# Patient Record
Sex: MALE | Race: WHITE | Employment: OTHER | URBAN - METROPOLITAN AREA
[De-identification: names, ages, dates, MRNs, and addresses within clinical notes are randomized per-mention and may not be internally consistent; named-entity substitution may affect disease eponyms.]

---

## 2022-01-18 ENCOUNTER — HOSPITAL ENCOUNTER (INPATIENT)
Age: 82
LOS: 1 days | Discharge: HOME | DRG: 303 | End: 2022-01-19
Attending: EMERGENCY MEDICINE | Admitting: FAMILY MEDICINE
Payer: MEDICARE

## 2022-01-18 ENCOUNTER — APPOINTMENT (OUTPATIENT)
Dept: GENERAL RADIOLOGY | Age: 82
DRG: 303 | End: 2022-01-18
Payer: MEDICARE

## 2022-01-18 DIAGNOSIS — N18.9 CHRONIC KIDNEY DISEASE, UNSPECIFIED CKD STAGE: ICD-10-CM

## 2022-01-18 DIAGNOSIS — R07.9 CHEST PAIN, UNSPECIFIED TYPE: Primary | ICD-10-CM

## 2022-01-18 LAB
ABSOLUTE EOS #: 0.19 K/UL (ref 0–0.44)
ABSOLUTE IMMATURE GRANULOCYTE: 0.02 K/UL (ref 0–0.3)
ABSOLUTE LYMPH #: 1.18 K/UL (ref 1.1–3.7)
ABSOLUTE MONO #: 0.63 K/UL (ref 0.1–1.2)
ANION GAP SERPL CALCULATED.3IONS-SCNC: 15 MMOL/L (ref 9–17)
BASOPHILS # BLD: 1 % (ref 0–2)
BASOPHILS ABSOLUTE: 0.04 K/UL (ref 0–0.2)
BNP INTERPRETATION: NORMAL
BUN BLDV-MCNC: 27 MG/DL (ref 8–23)
BUN/CREAT BLD: 16 (ref 9–20)
CALCIUM SERPL-MCNC: 9.3 MG/DL (ref 8.6–10.4)
CHLORIDE BLD-SCNC: 101 MMOL/L (ref 98–107)
CO2: 21 MMOL/L (ref 20–31)
CREAT SERPL-MCNC: 1.67 MG/DL (ref 0.7–1.2)
DIFFERENTIAL TYPE: ABNORMAL
EOSINOPHILS RELATIVE PERCENT: 3 % (ref 1–4)
GFR AFRICAN AMERICAN: 48 ML/MIN
GFR NON-AFRICAN AMERICAN: 40 ML/MIN
GFR SERPL CREATININE-BSD FRML MDRD: ABNORMAL ML/MIN/{1.73_M2}
GFR SERPL CREATININE-BSD FRML MDRD: ABNORMAL ML/MIN/{1.73_M2}
GLUCOSE BLD-MCNC: 188 MG/DL (ref 70–99)
HCT VFR BLD CALC: 41.7 % (ref 40.7–50.3)
HEMOGLOBIN: 14.1 G/DL (ref 13–17)
IMMATURE GRANULOCYTES: 0 %
LYMPHOCYTES # BLD: 20 % (ref 24–43)
MCH RBC QN AUTO: 31.8 PG (ref 25.2–33.5)
MCHC RBC AUTO-ENTMCNC: 33.8 G/DL (ref 28.4–34.8)
MCV RBC AUTO: 94.1 FL (ref 82.6–102.9)
MONOCYTES # BLD: 11 % (ref 3–12)
NRBC AUTOMATED: 0 PER 100 WBC
PDW BLD-RTO: 12 % (ref 11.8–14.4)
PLATELET # BLD: 165 K/UL (ref 138–453)
PLATELET ESTIMATE: ABNORMAL
PMV BLD AUTO: 9.7 FL (ref 8.1–13.5)
POTASSIUM SERPL-SCNC: 3.7 MMOL/L (ref 3.7–5.3)
PRO-BNP: 80 PG/ML
RBC # BLD: 4.43 M/UL (ref 4.21–5.77)
RBC # BLD: ABNORMAL 10*6/UL
SEG NEUTROPHILS: 65 % (ref 36–65)
SEGMENTED NEUTROPHILS ABSOLUTE COUNT: 3.93 K/UL (ref 1.5–8.1)
SODIUM BLD-SCNC: 137 MMOL/L (ref 135–144)
TROPONIN INTERP: ABNORMAL
TROPONIN INTERP: ABNORMAL
TROPONIN INTERP: NORMAL
TROPONIN T: ABNORMAL NG/ML
TROPONIN T: ABNORMAL NG/ML
TROPONIN T: NORMAL NG/ML
TROPONIN, HIGH SENSITIVITY: 19 NG/L (ref 0–22)
TROPONIN, HIGH SENSITIVITY: 23 NG/L (ref 0–22)
TROPONIN, HIGH SENSITIVITY: 28 NG/L (ref 0–22)
WBC # BLD: 6 K/UL (ref 3.5–11.3)
WBC # BLD: ABNORMAL 10*3/UL

## 2022-01-18 PROCEDURE — 80048 BASIC METABOLIC PNL TOTAL CA: CPT

## 2022-01-18 PROCEDURE — 96372 THER/PROPH/DIAG INJ SC/IM: CPT

## 2022-01-18 PROCEDURE — 83880 ASSAY OF NATRIURETIC PEPTIDE: CPT

## 2022-01-18 PROCEDURE — 6370000000 HC RX 637 (ALT 250 FOR IP): Performed by: EMERGENCY MEDICINE

## 2022-01-18 PROCEDURE — 6360000002 HC RX W HCPCS: Performed by: EMERGENCY MEDICINE

## 2022-01-18 PROCEDURE — 93005 ELECTROCARDIOGRAM TRACING: CPT | Performed by: EMERGENCY MEDICINE

## 2022-01-18 PROCEDURE — 2000000000 HC ICU R&B

## 2022-01-18 PROCEDURE — 85025 COMPLETE CBC W/AUTO DIFF WBC: CPT

## 2022-01-18 PROCEDURE — 2580000003 HC RX 258: Performed by: EMERGENCY MEDICINE

## 2022-01-18 PROCEDURE — 71045 X-RAY EXAM CHEST 1 VIEW: CPT

## 2022-01-18 PROCEDURE — 36415 COLL VENOUS BLD VENIPUNCTURE: CPT

## 2022-01-18 PROCEDURE — 6370000000 HC RX 637 (ALT 250 FOR IP): Performed by: INTERNAL MEDICINE

## 2022-01-18 PROCEDURE — 99284 EMERGENCY DEPT VISIT MOD MDM: CPT

## 2022-01-18 PROCEDURE — 84484 ASSAY OF TROPONIN QUANT: CPT

## 2022-01-18 PROCEDURE — 6370000000 HC RX 637 (ALT 250 FOR IP): Performed by: FAMILY MEDICINE

## 2022-01-18 RX ORDER — DILTIAZEM HYDROCHLORIDE 180 MG/1
180 CAPSULE, COATED, EXTENDED RELEASE ORAL DAILY
COMMUNITY

## 2022-01-18 RX ORDER — SODIUM CHLORIDE 0.9 % (FLUSH) 0.9 %
5-40 SYRINGE (ML) INJECTION EVERY 12 HOURS SCHEDULED
Status: DISCONTINUED | OUTPATIENT
Start: 2022-01-18 | End: 2022-01-19 | Stop reason: HOSPADM

## 2022-01-18 RX ORDER — EZETIMIBE 10 MG/1
10 TABLET ORAL NIGHTLY
COMMUNITY

## 2022-01-18 RX ORDER — SODIUM CHLORIDE 9 MG/ML
25 INJECTION, SOLUTION INTRAVENOUS PRN
Status: DISCONTINUED | OUTPATIENT
Start: 2022-01-18 | End: 2022-01-19 | Stop reason: HOSPADM

## 2022-01-18 RX ORDER — METOPROLOL SUCCINATE 25 MG/1
25 TABLET, EXTENDED RELEASE ORAL DAILY
Status: DISCONTINUED | OUTPATIENT
Start: 2022-01-18 | End: 2022-01-18

## 2022-01-18 RX ORDER — METOPROLOL SUCCINATE 25 MG/1
25 TABLET, EXTENDED RELEASE ORAL NIGHTLY
Status: DISCONTINUED | OUTPATIENT
Start: 2022-01-18 | End: 2022-01-19 | Stop reason: HOSPADM

## 2022-01-18 RX ORDER — VITAMIN B COMPLEX
1000 TABLET ORAL DAILY
Status: DISCONTINUED | OUTPATIENT
Start: 2022-01-18 | End: 2022-01-19 | Stop reason: HOSPADM

## 2022-01-18 RX ORDER — ASPIRIN 325 MG
325 TABLET ORAL DAILY
COMMUNITY

## 2022-01-18 RX ORDER — MORPHINE SULFATE 4 MG/ML
4 INJECTION, SOLUTION INTRAMUSCULAR; INTRAVENOUS
Status: DISCONTINUED | OUTPATIENT
Start: 2022-01-18 | End: 2022-01-19 | Stop reason: HOSPADM

## 2022-01-18 RX ORDER — ONDANSETRON 2 MG/ML
4 INJECTION INTRAMUSCULAR; INTRAVENOUS EVERY 6 HOURS PRN
Status: DISCONTINUED | OUTPATIENT
Start: 2022-01-18 | End: 2022-01-19 | Stop reason: HOSPADM

## 2022-01-18 RX ORDER — METOPROLOL SUCCINATE 25 MG/1
25 TABLET, EXTENDED RELEASE ORAL NIGHTLY
Status: DISCONTINUED | OUTPATIENT
Start: 2022-01-19 | End: 2022-01-18

## 2022-01-18 RX ORDER — EZETIMIBE 10 MG/1
10 TABLET ORAL NIGHTLY
Status: DISCONTINUED | OUTPATIENT
Start: 2022-01-18 | End: 2022-01-19 | Stop reason: HOSPADM

## 2022-01-18 RX ORDER — MORPHINE SULFATE 4 MG/ML
4 INJECTION, SOLUTION INTRAMUSCULAR; INTRAVENOUS ONCE
Status: DISCONTINUED | OUTPATIENT
Start: 2022-01-18 | End: 2022-01-19 | Stop reason: HOSPADM

## 2022-01-18 RX ORDER — KRILL/OM-3/DHA/EPA/PHOSPHO/AST 500MG-86MG
1 CAPSULE ORAL DAILY
Status: DISCONTINUED | OUTPATIENT
Start: 2022-01-18 | End: 2022-01-18 | Stop reason: RX

## 2022-01-18 RX ORDER — MULTIVITAMIN WITH IRON
1 TABLET ORAL DAILY
Status: DISCONTINUED | OUTPATIENT
Start: 2022-01-18 | End: 2022-01-19 | Stop reason: HOSPADM

## 2022-01-18 RX ORDER — CLOPIDOGREL BISULFATE 75 MG/1
75 TABLET ORAL DAILY
COMMUNITY

## 2022-01-18 RX ORDER — MORPHINE SULFATE 2 MG/ML
2 INJECTION, SOLUTION INTRAMUSCULAR; INTRAVENOUS
Status: DISCONTINUED | OUTPATIENT
Start: 2022-01-18 | End: 2022-01-19 | Stop reason: HOSPADM

## 2022-01-18 RX ORDER — TAMSULOSIN HYDROCHLORIDE 0.4 MG/1
0.4 CAPSULE ORAL DAILY
Status: DISCONTINUED | OUTPATIENT
Start: 2022-01-18 | End: 2022-01-19 | Stop reason: HOSPADM

## 2022-01-18 RX ORDER — VITAMIN B COMPLEX
1000 TABLET ORAL DAILY
COMMUNITY

## 2022-01-18 RX ORDER — HYDROCHLOROTHIAZIDE 12.5 MG/1
12.5 CAPSULE, GELATIN COATED ORAL DAILY
COMMUNITY

## 2022-01-18 RX ORDER — DILTIAZEM HYDROCHLORIDE 180 MG/1
180 CAPSULE, COATED, EXTENDED RELEASE ORAL DAILY
Status: DISCONTINUED | OUTPATIENT
Start: 2022-01-18 | End: 2022-01-19 | Stop reason: HOSPADM

## 2022-01-18 RX ORDER — ACETAMINOPHEN 500 MG
1000 TABLET ORAL EVERY 8 HOURS PRN
Status: DISCONTINUED | OUTPATIENT
Start: 2022-01-18 | End: 2022-01-19 | Stop reason: HOSPADM

## 2022-01-18 RX ORDER — HYDROCHLOROTHIAZIDE 12.5 MG/1
12.5 CAPSULE, GELATIN COATED ORAL EVERY OTHER DAY
Status: DISCONTINUED | OUTPATIENT
Start: 2022-01-19 | End: 2022-01-19 | Stop reason: HOSPADM

## 2022-01-18 RX ORDER — METOPROLOL SUCCINATE 25 MG/1
25 TABLET, EXTENDED RELEASE ORAL DAILY
COMMUNITY

## 2022-01-18 RX ORDER — SODIUM CHLORIDE 0.9 % (FLUSH) 0.9 %
5-40 SYRINGE (ML) INJECTION PRN
Status: DISCONTINUED | OUTPATIENT
Start: 2022-01-18 | End: 2022-01-19 | Stop reason: HOSPADM

## 2022-01-18 RX ORDER — ATORVASTATIN CALCIUM 20 MG/1
20 TABLET, FILM COATED ORAL DAILY
Status: DISCONTINUED | OUTPATIENT
Start: 2022-01-18 | End: 2022-01-19 | Stop reason: HOSPADM

## 2022-01-18 RX ORDER — TAMSULOSIN HYDROCHLORIDE 0.4 MG/1
0.4 CAPSULE ORAL DAILY
Status: DISCONTINUED | OUTPATIENT
Start: 2022-01-18 | End: 2022-01-18

## 2022-01-18 RX ORDER — SIMVASTATIN 40 MG
40 TABLET ORAL EVERY MORNING
COMMUNITY

## 2022-01-18 RX ORDER — ONDANSETRON 4 MG/1
4 TABLET, ORALLY DISINTEGRATING ORAL EVERY 8 HOURS PRN
Status: DISCONTINUED | OUTPATIENT
Start: 2022-01-18 | End: 2022-01-19 | Stop reason: HOSPADM

## 2022-01-18 RX ORDER — CLOPIDOGREL BISULFATE 75 MG/1
75 TABLET ORAL DAILY
Status: DISCONTINUED | OUTPATIENT
Start: 2022-01-18 | End: 2022-01-19 | Stop reason: HOSPADM

## 2022-01-18 RX ORDER — NITROGLYCERIN 40 MG/1
1 PATCH TRANSDERMAL DAILY
Status: DISCONTINUED | OUTPATIENT
Start: 2022-01-18 | End: 2022-01-19 | Stop reason: HOSPADM

## 2022-01-18 RX ORDER — HYDROCHLOROTHIAZIDE 12.5 MG/1
12.5 CAPSULE, GELATIN COATED ORAL DAILY
Status: DISCONTINUED | OUTPATIENT
Start: 2022-01-18 | End: 2022-01-18

## 2022-01-18 RX ORDER — NITROGLYCERIN 0.4 MG/1
0.4 TABLET SUBLINGUAL EVERY 5 MIN PRN
Status: DISCONTINUED | OUTPATIENT
Start: 2022-01-18 | End: 2022-01-19 | Stop reason: HOSPADM

## 2022-01-18 RX ORDER — NITROGLYCERIN 0.4 MG/1
0.4 TABLET SUBLINGUAL EVERY 5 MIN PRN
COMMUNITY

## 2022-01-18 RX ORDER — ALFUZOSIN HYDROCHLORIDE 10 MG/1
10 TABLET, EXTENDED RELEASE ORAL DAILY
COMMUNITY

## 2022-01-18 RX ORDER — ASPIRIN 325 MG
325 TABLET ORAL DAILY
Status: DISCONTINUED | OUTPATIENT
Start: 2022-01-18 | End: 2022-01-19 | Stop reason: HOSPADM

## 2022-01-18 RX ADMIN — MULTIVITAMIN TABLET 1 TABLET: TABLET at 09:46

## 2022-01-18 RX ADMIN — ATORVASTATIN CALCIUM 20 MG: 20 TABLET, FILM COATED ORAL at 09:46

## 2022-01-18 RX ADMIN — ENOXAPARIN SODIUM 40 MG: 100 INJECTION SUBCUTANEOUS at 09:46

## 2022-01-18 RX ADMIN — TAMSULOSIN HYDROCHLORIDE 0.4 MG: 0.4 CAPSULE ORAL at 17:58

## 2022-01-18 RX ADMIN — CLOPIDOGREL BISULFATE 75 MG: 75 TABLET ORAL at 09:46

## 2022-01-18 RX ADMIN — METOPROLOL SUCCINATE 25 MG: 25 TABLET, EXTENDED RELEASE ORAL at 21:53

## 2022-01-18 RX ADMIN — EZETIMIBE 10 MG: 10 TABLET ORAL at 21:15

## 2022-01-18 RX ADMIN — ASPIRIN 325 MG: 325 TABLET ORAL at 09:46

## 2022-01-18 RX ADMIN — SODIUM CHLORIDE, PRESERVATIVE FREE 10 ML: 5 INJECTION INTRAVENOUS at 21:14

## 2022-01-18 RX ADMIN — SODIUM CHLORIDE, PRESERVATIVE FREE 10 ML: 5 INJECTION INTRAVENOUS at 09:53

## 2022-01-18 RX ADMIN — Medication 1000 UNITS: at 09:46

## 2022-01-18 RX ADMIN — DILTIAZEM HYDROCHLORIDE 180 MG: 180 CAPSULE, COATED, EXTENDED RELEASE ORAL at 09:46

## 2022-01-18 ASSESSMENT — ENCOUNTER SYMPTOMS
SHORTNESS OF BREATH: 0
BACK PAIN: 0
NAUSEA: 0
VOMITING: 0

## 2022-01-18 ASSESSMENT — HEART SCORE: ECG: 1

## 2022-01-18 ASSESSMENT — PAIN SCALES - GENERAL: PAINLEVEL_OUTOF10: 0

## 2022-01-18 NOTE — PROGRESS NOTES
1230 - pt walked hallways per Dr. Efrain Rosas request, RN assessed for CP during and after walking. No complaints of chest pain    1330 - pt walked hallways, no chest pain    RN paged Dr. Juan Pablo Kumar to let him know that cardiology is okay with discharge. Awaiting response at this time.

## 2022-01-18 NOTE — CARE COORDINATION
Case Management Initial Discharge Plan  Saumya Macias,             Met with:patient to discuss discharge plans. Information verified: address, contacts, phone number, , insurance Yes  Insurance Provider: medicare    Emergency Contact/Next of Kin name & number: spouse/Roxanne   [phone]  Who are involved in patient's support system? spouse    PCP: Monae Lawrence MD  Date of last visit: 2021      Discharge Planning    Living Arrangements:        Home has 1 stories  2 stairs to climb to get into front door, 0stairs to climb to reach second floor  Location of bedroom/bathroom in home main    Patient able to perform ADL's:Independent    Current Services (outpatient & in home) none  DME equipment: CPAP  DME provider: unknown    Is patient receiving oral anticoagulation therapy? No    If indicated:   Physician managing anticoagulation treatment:   Where does patient obtain lab work for ATC treatment? Potential Assistance Needed:       Patient agreeable to home care: No  [place] of choice provided:  n/a    Prior SNF/Rehab Placement and Facility: n/a  Agreeable to SNF/Rehab: No  [place] of choice provided: n/a     Evaluation: no    Expected Discharge date:       Patient expects to be discharged to: If home: is the family and/or caregiver wiling & able to provide support at home? yes  Who will be providing this support? Self and spouse    Follow Up Appointment: Best Day/ Time:      Transportation provider: spouse  Transportation arrangements needed for discharge: No    Readmission Risk              Risk of Unplanned Readmission:  14             Does patient have a readmission risk score greater than 14?: No  If yes, follow-up appointment must be made within 7 days of discharge.      Goals of Care:       Educated patient on transitional options, provided freedom of choice and are agreeable with plan      Discharge Plan: Chest pain  Patient lives with spouse in a 1 story home with 2 steps to enter. Declines any skilled needs. Independent  Uses a CPAP. Pharmacy is [facility] on [address]. Plan is to treat with med management. Patient has a history of stents and CAD. Continue to follow.              Electronically signed by Nitin Grey RN on 1/18/22 at 12:40 PM EST

## 2022-01-18 NOTE — ED NOTES
Pt presents to the ER for chest pressure. Pt states the chest pressure was a centralized pressure that woke him up around 0030. Pt states he took his aspirin and the squad gave him a nitro spray. Pt states at this point his chest pressure is totally gone and he does not have any complaints. Pt denies any pain or SOB laying on the cot. Pt has cardiac history including hypertension and cardiac caths with stent placement. Pt cardiologist is [facility]. He states he last saw him in June.       John Barnes RN  01/18/22 [id number]

## 2022-01-18 NOTE — PLAN OF CARE
Problem: Pain:  Goal: Pain level will decrease  Description: Pain level will decrease  Outcome: Ongoing     Problem: Cardiac:  Goal: Ability to maintain an adequate cardiac output will improve  Description: Ability to maintain an adequate cardiac output will improve  Outcome: Ongoing  Goal: Hemodynamic stability will improve  Description: Hemodynamic stability will improve  Outcome: Ongoing     Problem: Fluid Volume:  Goal: Ability to achieve and maintain adequate urine output will improve  Description: Ability to achieve and maintain adequate urine output will improve  Outcome: Ongoing     Problem: Respiratory:  Goal: Respiratory status will improve  Description: Respiratory status will improve  Outcome: Ongoing     Problem: Falls - Risk of:  Goal: Will remain free from falls  Description: Will remain free from falls  Outcome: Ongoing  Goal: Absence of physical injury  Description: Absence of physical injury  Outcome: Ongoing

## 2022-01-18 NOTE — DISCHARGE INSTR - DIET
added salts and sugars. The more colors of the rainbow you consume, the greater variety of nutrients youre getting. Vahidguille Erick your vegetables each day and try to pick more of the non-starchy options [like potatoes and sweet potatoes], says Sirena Chaudhary. I find that often the white or beige vegetables are forgotten about and viewed as not as nutritious, but these foods, such as onion, cauliflower, and mushrooms, are incredibly healthy.  She also recommends:    Spinach  Broccoli  Cauliflower  Bok edmar  Tomato  Arugula  Leblanc peppers  Carrots  Asparagus  Soluble Fiber  You probably think of fiber as good for digestion, but its also an important component of a heart healthy diet. One of the most important nutrients for heart health is soluble fiber, explains Sirena Chaudhary. Eating soluble fiber can help lower your cholesterol level and better manage blood sugar levels.  Aim for about 10 to 25 grams of soluble fiber per day; you can find it in:    Fiber:  Oats  Beans  Berries  Ground flaxseed    Omega-3 Fatty Acids  Omega-3 fatty acids are found in fatty fish and in some nuts and seeds. These good fats can reduce blood pressure, decrease triglyceride levels, slow the growth of plaque in the arteries and reduce the risk of arrhythmias. Your doctor may prescribe an Omega-3 supplement if youre on a heart patient diet but you should also be eating Omega-3-rich foods such as:    [place]  Ground flaxseed  Hemp Seeds  Joe seeds      Have High Cholesterol? Foods to Avoid:  If you have high blood cholesterol or another cardiovascular health concern, there are certain foods youll want to avoid to keep your heart healthy. One common misconception is that all high cholesterol foods should be avoided completely.  Cholesterol from your diet actually doesnt affect your blood cholesterol levels like it was once thought, says Deysi Garza MS, registered dietitian at [facility]. [facility], you do have to be careful because oftentimes foods high in cholesterol are also high in saturated fat, which needs to be limited on a heart healthy diet.  In other words, dont indulge in galan and whole milk. But go ahead and eat eggs, salmon and shrimp even though they have cholesterol, since theyre not high in saturated fat. Instead of focusing on high cholesterol foods while on a cardiac diet, avoid trans fats and saturated fats and foods high in salt and sugar. Trans Fats and Saturated Fats  Overall, we are more concerned about trans fats raising our blood cholesterol [than we are concerned about high cholesterol foods], explains Raina Griffith. Its recommended you consume zero of this type of fat because it has been so strongly linked with heart disease.       She explains that while trans fats have been banned from processed foods, theyre still present in some foods in small quantities. For example, a jar of peanut butter could say it has 0 grams of trans fat but really contain about 0.4 grams per serving. Several foods with just a little trans fat can add up to too much trans fat. So check the label and make sure the foods youre eating dont contain partially hydrogenated oils.      This can include:    Peanut butter  Packaged cookies  Packaged cakes  Donuts and muffins  For a hearty healthy diet, avoid trans fat. This means choosing baked or roasted foods over fried ones. Also eat red meat about once or twice a week (or less), and select lean cuts, such as sirloin or filet mignon. Berna Wells clear of: Fatty cuts of read meat (porterhouse, rib eye, prime rib)  Any fried food    Saturated fats mostly come from meat and dairy products. Avoiding foods high in saturated fat--and choosing healthier options--can lower your cholesterol level and boost your lipid profile. Fatty beef is an example of a food with saturated fat.  Also on the list is:    [place] [address] with skin  Butter  Cheese and other whole or reduced-fat dairy products  Whole fat dairy     SALT:   Too much salt in your diet is bad for your cardiovascular health. Thats because extra sodium increases blood volume in your blood vessels, raising blood pressure and making your heart work harder to pump it. Eat 1,500 milligrams or less of sodium per day to keep blood pressure low. Your first step is keeping the saltshaker off the table. Instead, use herbs and spices or a salt-substitute such as Mrs. Georges, suggests Herlinda Cortes. Read the label on any pre-made spice mixtures, since often the first ingredient is salt, and you want to stay away from that.  Also be careful of hidden salt in the foods youre eating. Anything over 140 mg of sodium per serving is a no-no. And surprisingly, these foods may be high in sodium:    Cereal  Condiments  Sauces  Sweets (like cookies and cakes)  Sugar  Sorry if youve got a sweet tooth--researchers say eating too much sugar is connected to a higher risk of dying from heart disease. Sadly, most of us eat too much. The average American eats about 22 teaspoons of sugar per day. However, the American Heart Association recommends women eat no more than 6 teaspoons of sugar a day (a.k.a. 24 grams or 100 calories) and men eat no more than 9 teaspoons a day (a.k.a. 36 grams or 150 calories). To significantly reduce your sugar intake, avoid foods with added sugar, such as: Soft drinks  Fruit drinks  Candy  Cakes, cookies and pies  Ice cream  Sweetened yogurt and milk  Sweet breads and waffles  Look out for secret sources of sugar like breads, cereals, yogurts, condiments and sauces, says Herlinda Cortes. Choose foods with less than 9 grams of sugar per serving.       Creating a Heart Healthy Diet Plan   As you work with your doctor and/or nutritionist to create a heart healthy diet plan, youll learn ways to stick to the plan and create delicious meals you and your family can enjoy.       Dr. Rosa Jerez recommends not only stocking your fridge and pantry with healthy foods but your freezer too. Thats because many fruits and vegetables spoil quickly. Raw lean meat may only be usable for a few days in the fridge. But frozen items can last for month. If you always have some foods that fit your cardiac diet in the freezer, youll be able to easily whip something up, even when youre in a rush. Breakfast Ideas: At breakfast, beware of the hidden sugars in many cereals and juices, and look for ways to incorporate lean protein, fiber and Omega-3s into your morning meal. Carter De Luna suggests:    Healthy Omelet: 1 egg + 2 egg whites with ¼ to ½ an avocado and veggies with a few tbsp. hummus or ½ cup baked sweet potato      Tofu Scramble: Tofu (or egg) scramble with tomato, spinach, black beans, garlic a few slices of avocado with 1 slice of 467% whole wheat bread    Loaded Oatmeal: 1 cup cooked rolled gluten-free oats with cinnamon; mix in 1 tbsp almond butter and top with few chopped walnuts, ½ sliced small banana    Protein-Packed Rice Cake: Brown rice cake with 1-2 tbsp low sodium peanut or almond butter (with no partially hydrogenated oils) with 1 small sliced banana      Many typical lunch foods--cold cuts, cured meats, pizza and soup--are high in sodium, so keep that in mind. You probably want to pack your own.  These are a few delicious lunch ideas youll want to whip up:      Chicken Avocado [place]: 100% whole wheat bread with baked chicken, few slice of avocado, lettuce, tomato with side salad of veggies (i.e. beets, onion, carrots) and chickpeas or black beans with olive oil and vinegar    Homemade Rice or Quinoa Bowl: 1/2 to 2/3 cup brown rice or quinoa, ½ cup black beans or kingsley beans, 1-2 cup of veggies (i.e. spinach, broccoli, cauliflower, string beans), topped with baked chicken, fish or tofu    [place] Burger: Make a burger from ground lean turkey with scallion and red pepper and top with few slices of avocado (or 1 slice Swiss cheese), served in low sodium brown rice tortilla or steamed juan david greens    Avocado Tuna Salad: Tuna salad made with ½ mashed avocado with sliced grapes and few chopped walnuts, lettuce and slice of tomato on 1 slice of whole grain bread or on bed of greens      Low Sodium Bean Soup or Chili: Low sodium chili or bean based soup, topped with few slices of avocado. If this is your entire meal, can aim for less than or equal to 500 to 550 milligrams sodium for the soup. Dinner Ideas  The way you prepare your dinner will help you stick to your heart-healthy diet. Select lean cuts of meat and trim fat (and remove poultry skin) before cooking. Broil meat instead of pan-frying it, and drain fat from foods before eating them. You can also make some smart substitutions, such as using low-fat or fat-free cheese and milk, and cooking with liquid vegetable oil (olive, sunflower, canola) instead of solid fats, such as butter, lard and shortening. Here are a few dinner ideas that are both tasty and cardiac-diet friendly:      Baked Chicken or Fish: Bake it with 1 tbsp extra virgin olive oil (or try avocado oil) and a few tablespoons of salsa; serve it with cooked vegetables (i.e. broccoli, asparagus, spinach) and whole grain starch (i.e. ½  cup cooked brown rice, whole wheat pasta, or bean-based pasta)    Breaded Baked [place]: Coat salmon with olive oil, whole wheat bread crumbs, mustard and lemon; serve it with side of vegetable (i.e. broccoli, sautéed spinach with garlic) and a whole grain starch (i.e. sweet potato, quinoa)    [place] ([facility]) Meatballs: Make your meatballs with one pound lean ground turkey, ½  cup quick oats, 1 egg, ½  tsp dried oregano and little pepper. When theyre done cooking, drizzle them with olive oil    Feta Chicken: Bake chicken, and serve it with a side of ½ cup baked butternut squash, ½ cup sautéed broccoli and ½ cup quinoa mixed together.  Top with sprinkle of feta cheese      Weve got even more recipes for heart healthy entrées, heart-healthy seafood recipes and heart-healthy vegetarian recipes. These are a few ideas William Mustafa loves:    1 hard boiled egg with a piece of fruit  Hummus with cut up fresh (or roasted) vegetables (i.e. carrots, peppers, broccoli)  Slice of 551% whole wheat bread with almond or peanut butter and sliced banana  Slice of 509% whole grain bread with ½ mashed avocado, topped with 1-2 tbsp ground flaxseed  [place] yogurt with 1 tbsp peanut/almond butter mixed in or topped with 10-15 nuts, ½ cup berries; can also add in 1 tbsp ground flaxseed, hemp seed or kirstin seed  And yes, you can occasionally indulge in dessert. Here are some heart-healthy dessert recipes we highly recommend. Remember: a change in your diet might tough at first but it truly can change your health--and your life--for the better. And with a little practice, youll get the hang of sticking to your cardiac diet and enjoying your food. Theres so much flexibility with a heart healthy diet, so it can be customized to work for different people, says [employer]. After you recipes you enjoy and making them part of your meal plans, it shouldnt feel like a diet, it should just become your routine. 

## 2022-01-18 NOTE — PROGRESS NOTES
RN paged Dr. Nancy Malin regarding new cardiology consult per ER physician. Cardiology group will follow. Pt sees Dr. Frank Bolden outpatient, last seen in June of this year.

## 2022-01-18 NOTE — ED PROVIDER NOTES
[facility]  Emergency Department Encounter     Pt Name: Juan Cotto  MRN: [id number]  [place] 1940  Date of evaluation: 1/18/22  PCP:  Eliz Garcia MD    [address] [place] [address]       Chief Complaint   Patient presents with    Chest Pain       HISTORY OF PRESENT ILLNESS  (Location/Symptom, Timing/Onset, Context/Setting, Quality, Duration, Modifying Factors, Severity.)    Juan Cotto is a 79 y.o. male who presents with sudden onset of midsternal chest pain that woke him up from his sleep. Describes it as a pressuring sensation. No shortness of breath, nausea or vomiting, diaphoresis, back pain, lightheadedness or dizziness. He does have a known history of coronary artery disease and has multiple stents in his heart. He did take full dose baby aspirin while waiting for EMS to arrive. He also received 1 dose of nitro spray by EMS and states that his pain is now improved. States that he had a stress test done a couple months ago by his cardiologist which was negative. Cannot remember when his last echocardiogram was. PAST MEDICAL / SURGICAL / SOCIAL / FAMILY HISTORY    has a past medical history of CAD (coronary artery disease), Chronic kidney disease, Hypertension, Sinus infection, and Sleep apnea. CAD, HTN     has a past surgical history that includes Tonsillectomy; hernia repair; Appendectomy; angioplasty (04/12/2001); Coronary angioplasty (12/1995); Coronary angioplasty (04/12/2001); Cardiac catheterization (11/18/2005); Cataract removal (Left, 12/15/2011); Cataract removal (Right, 01/05/2012); TURP (02/02/2015); and TURP (01/30/2020).     Social History     Socioeconomic History    Marital status:      Spouse name: Not on file    Number of children: Not on file    Years of education: Not on file    Highest education level: Not on file   Occupational History    Not on file   Tobacco Use    Smoking status: Not on file    Smokeless tobacco: Not on file   Substance and Sexual Activity    Alcohol use: Not on file    Drug use: Not on file    Sexual activity: Not on file   Other Topics Concern    Not on file   Social History Narrative    Not on file     Social Determinants of Health     Financial Resource Strain:     Difficulty of Paying Living Expenses: Not on file   Food Insecurity:     Worried About Running Out of Food in the Last Year: Not on file    [place] of Food in the Last Year: Not on file   Transportation Needs:     Lack of Transportation (Medical): Not on file    Lack of Transportation (Non-Medical): Not on file   Physical Activity:     Days of Exercise per Week: Not on file    Minutes of Exercise per Session: Not on file   Stress:     Feeling of Stress : Not on file   Social Connections:     Frequency of Communication with Friends and Family: Not on file    Frequency of Social Gatherings with Friends and Family: Not on file    Attends Congregation Services: Not on file    Active Member of [address] [place] [address] or Organizations: Not on file    Attends Club or Organization Meetings: Not on file    Marital Status: Not on file   Intimate Partner Violence:     Fear of Current or Ex-Partner: Not on file    Emotionally Abused: Not on file    Physically Abused: Not on file    Sexually Abused: Not on file   Housing Stability:     Unable to Pay for Housing in the Last Year: Not on file    Number of [place] in the Last Year: Not on file    Unstable Housing in the Last Year: Not on file       No family history on file. Allergies:    Patient has no known allergies. Home Medications:  Prior to Admission medications    Medication Sig Start Date End Date Taking?  Authorizing Provider   clopidogrel (PLAVIX) 75 MG tablet Take 75 mg by mouth daily   Yes Historical Provider, MD   dilTIAZem (CARDIZEM CD) 180 MG extended release capsule Take 180 mg by mouth daily   Yes Historical Provider, MD   hydroCHLOROthiazide (MICROZIDE) 12.5 MG capsule Take 12.5 mg by mouth daily   Yes Historical Provider, MD   metoprolol succinate (TOPROL XL) 25 MG extended release tablet Take 25 mg by mouth daily   Yes Historical Provider, MD   nitroGLYCERIN (NITROSTAT) 0.4 MG SL tablet Place 0.4 mg under the tongue every 5 minutes as needed for Chest pain up to max of 3 total doses. If no relief after 1 dose, call 911. Yes Historical Provider, MD   simvastatin (ZOCOR) 40 MG tablet Take 40 mg by mouth every morning   Yes Historical Provider, MD   ezetimibe (ZETIA) 10 MG tablet Take 10 mg by mouth nightly   Yes Historical Provider, MD   halobetasol (ULTRAVATE) 0.05 % cream Apply topically 2 times daily as needed Apply topically 2 times daily. Yes Historical Provider, MD   alfuzosin (UROXATRAL) 10 MG extended release tablet Take 10 mg by mouth daily   Yes Historical Provider, MD   aspirin 325 MG tablet Take 325 mg by mouth daily   Yes Historical Provider, MD   Nizatidine (AXID PO) Take 1 tablet by mouth as needed   Yes Historical Provider, MD   KRILL OIL PO Take 1 capsule by mouth daily   Yes Historical Provider, MD   Multiple Vitamin (MULTIVITAMIN ADULT PO) Take 1 capsule by mouth daily   Yes Historical Provider, MD   Multiple Vitamins-Minerals (OCUVITE ADULT 50+ PO) Take 1 capsule by mouth daily   Yes Historical Provider, MD   Vitamin D (CHOLECALCIFEROL) 25 MCG (1000 UT) TABS tablet Take 1,000 Units by mouth daily   Yes Historical Provider, MD   Soap & Cleansers (CETAPHIL EX) Apply topically   Yes Historical Provider, MD       REVIEW OF SYSTEMS    (2-9 systems for level 4, 10 or more for level 5)    Review of Systems   Constitutional: Negative for diaphoresis. Respiratory: Negative for shortness of breath. Cardiovascular: Positive for chest pain. Gastrointestinal: Negative for nausea and vomiting. Genitourinary: Negative for flank pain. Musculoskeletal: Negative for back pain. Skin: Negative for pallor. Neurological: Negative for dizziness and light-headedness. PHYSICAL EXAM   (up to 7 for level 4, 8 or more for level 5)    VITALS:   Vitals:    22 0221 22 0230 22 0415 22 0500   BP: 134/82 115/73 100/72 108/76   Pulse: 93 94 81 77   Resp:    Temp: 98.4 °F (36.9 °C)      TempSrc: Oral      SpO2: 95% 95% 95% 96%   Weight: 215 lb (97.5 kg)      Height: 5' 6\" (1.676 m)          Physical Exam  Vitals and nursing note reviewed. Constitutional:       General: He is not in acute distress. Appearance: He is well-developed. He is obese. He is not diaphoretic. HENT:      Head: Normocephalic and atraumatic. Eyes:      Conjunctiva/sclera: Conjunctivae normal.   Cardiovascular:      Rate and Rhythm: Regular rhythm. Tachycardia present. Heart sounds: Normal heart sounds. Pulmonary:      Effort: Pulmonary effort is normal. No respiratory distress. Breath sounds: Normal breath sounds. No wheezing, rhonchi or rales. Abdominal:      General: There is no distension. Palpations: Abdomen is soft. Tenderness: There is no abdominal tenderness. Musculoskeletal:         General: Normal range of motion. Cervical back: Normal range of motion. Right lower le+ Pitting Edema present. Left lower le+ Pitting Edema present. Skin:     General: Skin is warm and dry. Coloration: Skin is not pale. Neurological:      General: No focal deficit present. Mental Status: He is alert.    Psychiatric:         Behavior: Behavior normal.         DIFFERENTIAL  DIAGNOSIS   PLAN (LABS / IMAGING / EKG):  Orders Placed This Encounter   Procedures    XR CHEST 1 VIEW    CBC with DIFF    Basic Metabolic Prof    Troponin    Brain Natriuretic Peptide    Telemetry monitoring - continuous duration    Inpatient consult to Cardiology    Inpatient consult to Hospitalist    EKG 12 Lead    Insert peripheral IV    PATIENT STATUS (FROM ED OR OR/PROCEDURAL) Inpatient       MEDICATIONS ORDERED:  Orders Placed This Encounter   Medications    morphine sulfate (PF) injection 4 mg     DIAGNOSTIC RESULTS / EMERGENCYDEPARTMENT COURSE / MDM   LABS:  Labs Reviewed   CBC WITH AUTO DIFFERENTIAL - Abnormal; Notable for the following components:       Result Value    Lymphocytes 20 (*)     All other components within normal limits   BASIC METABOLIC PANEL - Abnormal; Notable for the following components:    Glucose 188 (*)     BUN 27 (*)     CREATININE 1.67 (*)     GFR Non- 40 (*)     GFR  48 (*)     All other components within normal limits   TROPONIN - Abnormal; Notable for the following components:    Troponin, High Sensitivity 28 (*)     All other components within normal limits   TROPONIN   BRAIN NATRIURETIC PEPTIDE       RADIOLOGY:  XR CHEST 1 VIEW    Result Date: 1/18/2022  EXAMINATION: ONE XRAY VIEW OF THE CHEST 1/18/2022 2:26 am COMPARISON: None. HISTORY: ORDERING SYSTEM PROVIDED HISTORY: CP TECHNOLOGIST PROVIDED HISTORY: CP Reason for Exam: chest pain FINDINGS: The cardiomediastinal silhouette within normal limits for portable technique. There is elevation of right hemidiaphragm. No focal airspace disease. No pleural effusion or pneumothorax. No evidence of acute osseous abnormality. No evidence of acute cardiopulmonary disease.        EKG    EKG Interpretation    Interpreted by emergency department physician    Rhythm: sinus tachycardia  Rate: normal  Axis: left  Ectopy: none  Conduction: right bundle branch block (complete) and 1st degree AV block  ST Segments: no acute change  T Waves: no acute change  Q Waves: none    Clinical Impression: non-specific EKG    All EKG's are interpreted by the Emergency Department Physician whoeither signs or Co-signs this chart in the absence of a cardiologist.    EMERGENCY DEPARTMENT COURSE:  ED Course as of 01/18/22 0557   Tue Jan 18, 2022   0226 Sees Dr. Sachin Robles  [AO]   0230 CBC with DIFF(!):    WBC 6.0   RBC 4.43   Hemoglobin Quant 14.1   Hematocrit 41.7   MCV 94.1   MCH 31.8   MCHC 33.8   RDW 12.0   Platelet Count 888   MPV 9.7   NRBC Automated 0.0   Differential Type NOT REPORTED   Seg Neutrophils 65   Lymphocytes 20(!)   Monocytes 11   Eosinophils % 3   Basophils 1   Immature Granulocytes 0   Segs Absolute 3.93   Absolute Lymph # 1.18   Absolute Mono # 0.63   Absolute Eos # 0.19   Basophils Absolute 0.04   Absolute Immature Granulocyte 0.02   WBC Morphology NOT REPORTED   RBC Morphology NOT REPORTED   Platelet Estimate NOT REPORTED [AO]   9564 Basic Metabolic Prof(!):    Glucose 188(!)   BUN 27(!)   Creatinine 1.67(!)   Bun/Cre Ratio 16   CALCIUM, SERUM, 104184 9.3   Sodium 137   Potassium 3.7   Chloride 101   CO2 21   Anion Gap 15   GFR Non- 40(!)   GFR  48(!)   GFR Comment        GFR Staging NOT REPORTED [AO]   0258 Troponin, High Sensitivity: 19 [AO]   0258 Pro-BNP: 80 [AO]   0309 XR CHEST 1 VIEW [AO]   0341 Patient updated. Pain free. Wife at bedside  [AO]   0449 Troponin, High Sensitivity(!): 28 [AO]   0453 Patient updated on POC and plan for admission due to increase in troponin  [AO]   0517 Speaking to cards, agree to see as inpatient  [AO]   1822 Waiting for dr. Robina Farnsworth to call back for admission  [AO]   975 1611 Speaking to Dr. Robina Farnsworth, accepted patient bridging orders to be placed  [AO]      ED Course User Index  [AO] Elias Smithdo 1721, DO       MDM  Number of Diagnoses or Management Options  Chest pain, unspecified type  Chronic kidney disease, unspecified CKD stage  Diagnosis management comments: History: 0  EC  Patient Age: 1  *Risk factors for Atherosclerotic disease: Coronary Artery Disease; Hypercholesterolemia;  Hypertension  Risk Factors: 2  Troponin: 0  Heart Score Total: 5       Amount and/or Complexity of Data Reviewed  Clinical lab tests: ordered and reviewed  Tests in the radiology section of CPT®: reviewed and ordered  Review and summarize past medical records: yes  Discuss the patient with other providers: yes  Independent visualization of images, tracings, or specimens: yes    Patient Progress  Patient progress: stable      PROCEDURES:  Procedures     CONSULTS:  IP CONSULT TO CARDIOLOGY  IP CONSULT TO HOSPITALIST    CRITICAL CARE:  NONE    FINAL IMPRESSION     1. Chest pain, unspecified type    2. Chronic kidney disease, unspecified CKD stage       DISPOSITION / PLAN   DISPOSITION      ADMIT    Mary Rodriguez DO  Emergency Medicine Physician    (Please note that portions of this note were completed with a voice recognition program.  Efforts were made to edit the dictations but occasionally words are mis-transcribed.)        Elias Kay 1721,   01/18/22 8822

## 2022-01-18 NOTE — CONSULTS
Section of Cardiology   Consult Note      Reason for Consult: Chest pain  Requesting Physician: Kameron Powers MD    CHIEF COMPLAINT: Chest pain    History Obtained From:  patient, electronic medical record, patient's nurse    HISTORY OF PRESENT ILLNESS:      The patient is a 79 y.o. male with significant past medical history of coronary artery disease, status post PCI to unknown vessel who presents with chest pressure woke him up from sleep, he called 911 and upon arrival they gave him sublingual nitro spray and according the patient the pain subsided gradually after hour and a half or so, And no recurrence since then. The patient describes it as a pressure, mild to moderate in intensity. No radiation. No diaphoresis or shortness of breath. He is compliant with medications. The patient had remote history of stenting to unknown vessel in 2000 and 2005. He thinks it was me who placedstents. Troponin was checked and came back minimally elevated the highest was 27. At the time I saw the patient appeared to be comfortable and pleasant. He considers himself physically active person. He is compliant with medications. He denies other symptoms  Previous diagnostic testing for coronary artery disease includes: cardiac catheterization, echocardiogram, persantine thallium. Previous history of cardiac disease includes: coronary artery disease and coronary artery stent. Coronary artery disease risk factors include: advanced age (older than 54 for men, 72 for women), hypertension, male gender and sedentary lifestyle.     Past Medical History:    Past Medical History:   Diagnosis Date    CAD (coronary artery disease)     Chronic kidney disease     Hypertension     Sinus infection     Sleep apnea      Past Surgical History:    Past Surgical History:   Procedure Laterality Date    ANGIOPLASTY  04/12/2001    APPENDECTOMY      CARDIAC CATHETERIZATION  11/18/2005    stent RCA between 2 previous stents    CATARACT REMOVAL Left 12/15/2011    CATARACT REMOVAL Right 01/05/2012    CORONARY ANGIOPLASTY  12/1995    CORONARY ANGIOPLASTY  04/12/2001    3 stents in the RCA    HERNIA REPAIR      TONSILLECTOMY      TURP  02/02/2015    TURP  01/30/2020    TURBT bladder tumor/polyp     Home Medications:  Prior to Admission medications    Medication Sig Start Date End Date Taking? Authorizing Provider   clopidogrel (PLAVIX) 75 MG tablet Take 75 mg by mouth daily   Yes Historical Provider, MD   dilTIAZem (CARDIZEM CD) 180 MG extended release capsule Take 180 mg by mouth daily   Yes Historical Provider, MD   hydroCHLOROthiazide (MICROZIDE) 12.5 MG capsule Take 12.5 mg by mouth daily   Yes Historical Provider, MD   metoprolol succinate (TOPROL XL) 25 MG extended release tablet Take 25 mg by mouth daily   Yes Historical Provider, MD   nitroGLYCERIN (NITROSTAT) 0.4 MG SL tablet Place 0.4 mg under the tongue every 5 minutes as needed for Chest pain up to max of 3 total doses. If no relief after 1 dose, call 911. Yes Historical Provider, MD   simvastatin (ZOCOR) 40 MG tablet Take 40 mg by mouth every morning   Yes Historical Provider, MD   ezetimibe (ZETIA) 10 MG tablet Take 10 mg by mouth nightly   Yes Historical Provider, MD   halobetasol (ULTRAVATE) 0.05 % cream Apply topically 2 times daily as needed Apply topically 2 times daily.    Yes Historical Provider, MD   alfuzosin (UROXATRAL) 10 MG extended release tablet Take 10 mg by mouth daily   Yes Historical Provider, MD   aspirin 325 MG tablet Take 325 mg by mouth daily   Yes Historical Provider, MD   Nizatidine (AXID PO) Take 1 tablet by mouth as needed   Yes Historical Provider, MD   KRILL OIL PO Take 1 capsule by mouth daily   Yes Historical Provider, MD   Multiple Vitamin (MULTIVITAMIN ADULT PO) Take 1 capsule by mouth daily   Yes Historical Provider, MD   Multiple Vitamins-Minerals (OCUVITE ADULT 50+ PO) Take 1 capsule by mouth daily   Yes Historical Provider, MD Vitamin D (CHOLECALCIFEROL) 25 MCG (1000 UT) TABS tablet Take 1,000 Units by mouth daily   Yes Historical Provider, MD   Soap & Cleansers (CETAPHIL EX) Apply topically   Yes Historical Provider, MD     Current Medications:    Current Facility-Administered Medications   Medication Dose Route Frequency Provider Last Rate Last Admin    morphine sulfate (PF) injection 4 mg  4 mg IntraVENous Once Elias Waggoner Kay 1721, DO        aspirin tablet 325 mg  325 mg Oral Daily Mary Ellsworth, DO   325 mg at 01/18/22 0946    clopidogrel (PLAVIX) tablet 75 mg  75 mg Oral Daily Mary Ellsworth, DO   75 mg at 01/18/22 0946    dilTIAZem (CARDIZEM CD) extended release capsule 180 mg  180 mg Oral Daily Mary Ellsworth, DO   180 mg at 01/18/22 5349    ezetimibe (ZETIA) tablet 10 mg  10 mg Oral Nightly Mary Ellsworth, DO        multivitamin 1 tablet  1 tablet Oral Daily Mary Ellsworth, DO   1 tablet at 01/18/22 0946    atorvastatin (LIPITOR) tablet 20 mg  20 mg Oral Daily Mary Ellsworth, DO   20 mg at 01/18/22 0946    Vitamin D (CHOLECALCIFEROL) tablet 1,000 Units  1,000 Units Oral Daily Mary Ellsworth, DO   1,000 Units at 01/18/22 0946    sodium chloride flush 0.9 % injection 5-40 mL  5-40 mL IntraVENous 2 times per day Mary Ellsworth, DO   10 mL at 01/18/22 0953    sodium chloride flush 0.9 % injection 5-40 mL  5-40 mL IntraVENous PRN Mary Ellsworth, DO        0.9 % sodium chloride infusion  25 mL IntraVENous PRN Mary Ellsworth, DO        enoxaparin (LOVENOX) injection 40 mg  40 mg SubCUTAneous Daily Mary Ellsworth, DO   40 mg at 01/18/22 0946    acetaminophen (TYLENOL) tablet 1,000 mg  1,000 mg Oral Q8H PRN Mary Ellsworth, DO        ondansetron (ZOFRAN-ODT) disintegrating tablet 4 mg  4 mg Oral Q8H PRN Mary Ellsworth DO        Or    ondansetron (ZOFRAN) injection 4 mg  4 mg IntraVENous Q6H PRN Mary Ellsworth DO        morphine (PF) injection 2 mg  2 mg IntraVENous Q2H PRN Mayr Ellsworth DO Or    morphine sulfate (PF) injection 4 mg  4 mg IntraVENous Q2H PRN Mary Chick Dress, DO        tamsulosin [facility]) capsule 0.4 mg  0.4 mg Oral Daily Mary Chick Dress, DO        [START ON 1/19/2022] metoprolol succinate (TOPROL XL) extended release tablet 25 mg  25 mg Oral Nightly Mary Chick Dress, DO        [START ON 1/19/2022] hydroCHLOROthiazide (MICROZIDE) capsule 12.5 mg  12.5 mg Oral Every Other Day Mary Chick Dress, DO         Allergies:  Patient has no known allergies. Social History:    Social History     Socioeconomic History    Marital status:      Spouse name: Not on file    Number of children: Not on file    Years of education: Not on file    Highest education level: Not on file   Occupational History    Not on file   Tobacco Use    Smoking status: Not on file    Smokeless tobacco: Not on file   Substance and Sexual Activity    Alcohol use: Not on file    Drug use: Not on file    Sexual activity: Not on file   Other Topics Concern    Not on file   Social History Narrative    Not on file     Social Determinants of Health     Financial Resource Strain:     Difficulty of Paying Living Expenses: Not on file   Food Insecurity:     Worried About Running Out of Food in the Last Year: Not on file    [place] of Food in the Last Year: Not on file   Transportation Needs:     Lack of Transportation (Medical): Not on file    Lack of Transportation (Non-Medical):  Not on file   Physical Activity:     Days of Exercise per Week: Not on file    Minutes of Exercise per Session: Not on file   Stress:     Feeling of Stress : Not on file   Social Connections:     Frequency of Communication with Friends and Family: Not on file    Frequency of Social Gatherings with Friends and Family: Not on file    Attends Tenriism Services: Not on file    Active Member of Clubs or Organizations: Not on file    Attends Club or Organization Meetings: Not on file    Marital Status: Not on file Intimate Partner Violence:     Fear of Current or Ex-Partner: Not on file    Emotionally Abused: Not on file    Physically Abused: Not on file    Sexually Abused: Not on file   Housing Stability:     Unable to Pay for Housing in the Last Year: Not on file    Number of Places Lived in the Last Year: Not on file    Unstable Housing in the Last Year: Not on file     Family History:   No family history on file. · REVIEW OF SYSTEMS   Patient denies any fever chills or cough. No weakness or numbness in any arm or leg. Denies any bleeding from any orifice. Denies any nausea or vomiting. No abdominal pain. No change in his vision or hearing. PHYSICAL EXAM:    Vitals:    VITALS:  /76   Pulse 80   Temp 98.6 °F (37 °C) (Temporal)   Resp 16   Ht 5' 6\" (1.676 m)   Wt 215 lb (97.5 kg)   SpO2 96%   BMI 34.70 kg/m²   24HR INTAKE/OUTPUT:      Intake/Output Summary (Last 24 hours) at 1/18/2022 1118  Last data filed at 1/18/2022 0953  Gross per 24 hour   Intake 330 ml   Output    Net 330 ml       CONSTITUTIONAL:  awake, alert, cooperative, no apparent distress, and appears stated age  EYES: Pupils equal, round and reactive to light, extra ocular muscles intact, sclera clear, conjunctiva normal  ENT:  normocepalic, without obvious abnormality  NECK:  supple, symmetrical, trachea midline, no carotid bruit ,   No  JVD  BACK:  symmetric  LUNGS: Non-labored, good air exchange, clear to auscultation bilaterally, no crackles or wheezing  CARDIOVASCULAR:  Normal apical impulse, regular rate and rhythm, normal S1 and S2, no S3 or S4, and no murmur noted, no rub.  radial and bilateralpresent 2+  ABDOMEN:  No scars, normal bowel sounds, soft, no tenderness. He is mildly obese. .  MUSCULOSKELETAL:  there is no redness, warmth, or swelling of the joints   No leg edema. NEUROLOGIC:  Awake, alert, oriented to name, place and time.   SKIN:  no bruising or bleeding, normal skin color, texture, turgor and no jaundice    DATA:   ECG: Admission EKG showed normal sinus rhythm with right bundle branch block  ECHO: Date:   Not performed to date  Stress Test: Patient had stress test 7 months ago at our office which was normal  Angiography:  Not performed to date    Cardiology Labs:  Recent Labs     01/18/22  0217 01/18/22  0358 01/18/22  0915   TROPONINT NOT REPORTED NOT REPORTED NOT REPORTED     Warfarin PT/INR:No results found for: PROTIME, INR, WARFARIN  CBC:  Lab Results   Component Value Date    WBC 6.0 01/18/2022    RBC 4.43 01/18/2022    HGB 14.1 01/18/2022    HCT 41.7 01/18/2022    MCV 94.1 01/18/2022    MCH 31.8 01/18/2022    MCHC 33.8 01/18/2022    RDW 12.0 01/18/2022     01/18/2022    MPV 9.7 01/18/2022     CMP:  Lab Results   Component Value Date     01/18/2022    K 3.7 01/18/2022     01/18/2022    CO2 21 01/18/2022    BUN 27 01/18/2022    CREATININE 1.67 01/18/2022    GFRAA 48 01/18/2022    LABGLOM 40 01/18/2022    GLUCOSE 188 01/18/2022    CALCIUM 9.3 01/18/2022     Magnesium:  No results found for: MG  PTT:  No results found for: APTT, PTT  TSH:  No results found for: TSH  BMP:  Lab Results   Component Value Date     01/18/2022    K 3.7 01/18/2022     01/18/2022    CO2 21 01/18/2022    BUN 27 01/18/2022    CREATININE 1.67 01/18/2022    CALCIUM 9.3 01/18/2022    GFRAA 48 01/18/2022    LABGLOM 40 01/18/2022    GLUCOSE 188 01/18/2022     LIVER PROFILE:No results for input(s): AST, ALT, LABALBU, ALKPHOS, BILITOT, BILIDIR, IBILI, PROT, GLOB, ALBUMIN in the last 72 hours. FLP:  No results found for: CHOL, TRIG, HDL, LDLCHOLESTEROL      IMPRESSION  1. Chest pain suggestive of angina without good response to sublingual nitroglycerin  2. Minimal elevation of troponin  3. History of coronary disease and PCI in the remote past  4. History of hypertension, systemic  5. Hyperlipidemia  6. Obesity.   Patient Active Problem List   Diagnosis    Chest pain           RECOMMENDATIONS: Continue current medications  Management plan was discussed with patient   His cardiac status appears to be stable. I asked him to walk in the hallway and we will assess his response and make sure he does not get angina and if he remains stable he can be discharged and follow-up with his cardiologist.  I am adding Nitropatch 0.2 mg/h to his regimen and prescribed him sublingual nitroglycerin as well. I explained to the patient how to use the patch and if he has recurrent chest pain despite this therapy then invasive cardiac work-up should be considered. I do recommend repeating the stress test at this point in time since it has been fairly recent. Case discussed with the patient and his nurse who was present at the time of the interview. Patient understood and agreed to proceed. Thank you for the consultation.       Electronically signed by Panchito Sorenson MD on 1/18/2022 at 11:18 AM     CC: Juan Pablo Junior MD

## 2022-01-18 NOTE — FLOWSHEET NOTE
01/18/22 1210   Provider Notification   Reason for Communication Evaluate   Provider Name Dr. Ace Solis   Provider Notification Physician   Method of Communication Face to face   Response At bedside   Notification Time 1210     Dr. Ace Solsi at bedside for evaluation. No cardiac work up at this time, no repeat stress test at this time considering patient had recent stress test, which was normal.  . to add Nitro patch & sublingual.  Dr would like patient to walk in hallways, RN to assess for any angina. If no angina, pt can be discharged today from cardiology standpoint, follow up in office with Dr. Jen Miller. Dr. Noemy Rock returned page, will start Nitro patch and monitor overnight. Plan to discharge tomorrow.

## 2022-01-18 NOTE — FLOWSHEET NOTE
[facility] NOTE    Room # 2041/2041-01   Name: Saumya Macias            Church: [employer]    Reason for visit: Routine    I visited the patient. Admit Date & Time: 1/18/2022  2:11 AM    Assessment:  Saumya Macias is a 79 y.o. male in the hospital because \"chest pain. \" Upon entering the room patient was lying in bed, but alert and approachable. Intervention:  I introduced myself and my title as  I offered space for patient  to express feelings, needs, and concerns and provided a ministry presence. 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 Offered prayer for healing and spiritual care card. Patient expressed desire for anointing and eucharist.     Outcome:  Patient expressed gratitude. Plan:  Chaplains will remain available to offer spiritual and emotional support as needed. Electronically signed by Esther Matamoros on 1/18/2022 at 11:03 AM.  [id number] [facility]         01/18/22 1100   Encounter Summary   Services provided to: Patient   Referral/Consult From: [facility] Family members   Place of [address] [place] [address]  (Patient is Jalen Marr)   Contact Denominational Completed   Continue Visiting   (1/18/22)   Complexity of Encounter Low   Length of Encounter 15 minutes   Spiritual Assessment Completed Yes   Routine   Type Initial   Assessment Calm; Approachable; Hopeful;Coping   Intervention Active listening;Explored coping resources;Nurtured hope;Prayer;Sustaining presence/ Ministry of presence   Outcome Expressed gratitude

## 2022-01-19 VITALS
BODY MASS INDEX: 34.55 KG/M2 | OXYGEN SATURATION: 96 % | HEART RATE: 65 BPM | TEMPERATURE: 98.2 F | WEIGHT: 215 LBS | HEIGHT: 66 IN | SYSTOLIC BLOOD PRESSURE: 136 MMHG | RESPIRATION RATE: 18 BRPM | DIASTOLIC BLOOD PRESSURE: 79 MMHG

## 2022-01-19 PROCEDURE — 6360000002 HC RX W HCPCS: Performed by: EMERGENCY MEDICINE

## 2022-01-19 PROCEDURE — 6370000000 HC RX 637 (ALT 250 FOR IP): Performed by: INTERNAL MEDICINE

## 2022-01-19 PROCEDURE — 6370000000 HC RX 637 (ALT 250 FOR IP): Performed by: EMERGENCY MEDICINE

## 2022-01-19 PROCEDURE — 2580000003 HC RX 258: Performed by: EMERGENCY MEDICINE

## 2022-01-19 PROCEDURE — 96372 THER/PROPH/DIAG INJ SC/IM: CPT

## 2022-01-19 RX ORDER — NITROGLYCERIN 40 MG/1
1 PATCH TRANSDERMAL DAILY
Qty: 30 PATCH | Refills: 1 | Status: SHIPPED | OUTPATIENT
Start: 2022-01-20

## 2022-01-19 RX ORDER — OMEPRAZOLE 20 MG/1
20 CAPSULE, DELAYED RELEASE ORAL
Qty: 30 CAPSULE | Refills: 1 | Status: SHIPPED | OUTPATIENT
Start: 2022-01-19

## 2022-01-19 RX ADMIN — Medication 1000 UNITS: at 08:39

## 2022-01-19 RX ADMIN — ATORVASTATIN CALCIUM 20 MG: 20 TABLET, FILM COATED ORAL at 08:39

## 2022-01-19 RX ADMIN — CLOPIDOGREL BISULFATE 75 MG: 75 TABLET ORAL at 08:39

## 2022-01-19 RX ADMIN — HYDROCHLOROTHIAZIDE 12.5 MG: 12.5 CAPSULE ORAL at 08:39

## 2022-01-19 RX ADMIN — ENOXAPARIN SODIUM 40 MG: 100 INJECTION SUBCUTANEOUS at 08:40

## 2022-01-19 RX ADMIN — DILTIAZEM HYDROCHLORIDE 180 MG: 180 CAPSULE, COATED, EXTENDED RELEASE ORAL at 08:39

## 2022-01-19 RX ADMIN — SODIUM CHLORIDE, PRESERVATIVE FREE 10 ML: 5 INJECTION INTRAVENOUS at 08:39

## 2022-01-19 RX ADMIN — MULTIVITAMIN TABLET 1 TABLET: TABLET at 08:39

## 2022-01-19 RX ADMIN — ASPIRIN 325 MG: 325 TABLET ORAL at 08:39

## 2022-01-19 ASSESSMENT — PAIN SCALES - GENERAL: PAINLEVEL_OUTOF10: 0

## 2022-01-19 NOTE — PROGRESS NOTES
[facility].,    Adult Hospitalist      Name: Lyudmila Clifton  MRN: [id number]     Acct: [de-identified]  Room: 2041/2041-01    Admit Date: 1/18/2022  2:11 AM  PCP: Evelyn Woodson MD    Primary Problem  Active Problems:    Chest pain  Resolved Problems:    * No resolved hospital problems. *        Assesment:     · Acute chest pain  · Coronary artery disease, native vessel  · Essential hypertension  · Mixed hyperlipidemia  · Obesity with BMI 34  · Benign prostatic hypertrophy  · Obstructive sleep apnea  · Chronic kidney disease, stage unspecified  · Pedal edema        Plan:     · Admit to progressive  · Monitor vitals closely  · Keep SPO2 above 90%  · I's and O's  · IV fluids  · Pain control  · Antiemetics as needed  · Cardiology consulted in ER  · Serial cardiac enzymes  · Serial EKG  · Resume essential home meds  · CBC, BMP  · DVT and GI prophylaxis. Chief Complaint:     Chief Complaint   Patient presents with    Chest Pain         History of Present Illness:        Pt doing well  Note CV input  DC plan  Other ROS neg        INitial HPI  Lyudmila Clifton is a 79 y.o.  male who presents with Chest Pain    Patient was admitted through the emergency room where he presented with chest pain and chest pressure. Patient says he was woken up by the pressure around 12:30 AM.  Pain was dull and moderate. He denies any radiation of pain to the back sides, neck or his jaw. He denies any diaphoresis, nausea or vomiting at that time. Patient complains of some palpitations. He says he took an aspirin and called 911    Patient says he was given nitroglycerin sublingual and he felt significant improvement within the next few minutes. He says the pain was almost letting off by the time he was brought to the emergency room. Another dose was not repeated and the pain fully resolved in the emergency room.   Patient has a history of coronary artery disease and has history of cardiac catheterization and stent placement. Patient denies any orthopnea, dyspnea, headache, nausea or vomiting. Denies any photophobia or diplopia. Denies any back pain or joint swelling. Denies any diarrhea or constipation    I have personally reviewed the past medical history, past surgical history, medications, social history, and family history, and summarized in the note. Review of Systems:     All 10 point system is reviewed and negative otherwise mentioned in HPI. Past Medical History:     Past Medical History:   Diagnosis Date    CAD (coronary artery disease)     Chronic kidney disease     Hypertension     Sinus infection     Sleep apnea         Past Surgical History:     Past Surgical History:   Procedure Laterality Date    ANGIOPLASTY  04/12/2001    APPENDECTOMY      CARDIAC CATHETERIZATION  11/18/2005    stent RCA between 2 previous stents    CATARACT REMOVAL Left 12/15/2011    CATARACT REMOVAL Right 01/05/2012    CORONARY ANGIOPLASTY  12/1995    CORONARY ANGIOPLASTY  04/12/2001    3 stents in the RCA    HERNIA REPAIR      TONSILLECTOMY      TURP  02/02/2015    TURP  01/30/2020    TURBT bladder tumor/polyp        Medications Prior to Admission:       Prior to Admission medications    Medication Sig Start Date End Date Taking? Authorizing Provider   clopidogrel (PLAVIX) 75 MG tablet Take 75 mg by mouth daily   Yes Historical Provider, MD   dilTIAZem (CARDIZEM CD) 180 MG extended release capsule Take 180 mg by mouth daily   Yes Historical Provider, MD   hydroCHLOROthiazide (MICROZIDE) 12.5 MG capsule Take 12.5 mg by mouth daily   Yes Historical Provider, MD   metoprolol succinate (TOPROL XL) 25 MG extended release tablet Take 25 mg by mouth daily   Yes Historical Provider, MD   nitroGLYCERIN (NITROSTAT) 0.4 MG SL tablet Place 0.4 mg under the tongue every 5 minutes as needed for Chest pain up to max of 3 total doses. If no relief after 1 dose, call 911.    Yes Historical Provider, MD   simvastatin (ZOCOR) 40 MG tablet Take 40 mg by mouth every morning   Yes Historical Provider, MD   ezetimibe (ZETIA) 10 MG tablet Take 10 mg by mouth nightly   Yes Historical Provider, MD   halobetasol (ULTRAVATE) 0.05 % cream Apply topically 2 times daily as needed Apply topically 2 times daily. Yes Historical Provider, MD   alfuzosin (UROXATRAL) 10 MG extended release tablet Take 10 mg by mouth daily   Yes Historical Provider, MD   aspirin 325 MG tablet Take 325 mg by mouth daily   Yes Historical Provider, MD   Nizatidine (AXID PO) Take 1 tablet by mouth as needed   Yes Historical Provider, MD   KRILL OIL PO Take 1 capsule by mouth daily   Yes Historical Provider, MD   Multiple Vitamin (MULTIVITAMIN ADULT PO) Take 1 capsule by mouth daily   Yes Historical Provider, MD   Multiple Vitamins-Minerals (OCUVITE ADULT 50+ PO) Take 1 capsule by mouth daily   Yes Historical Provider, MD   Vitamin D (CHOLECALCIFEROL) 25 MCG (1000 UT) TABS tablet Take 1,000 Units by mouth daily   Yes Historical Provider, MD   Soap & Cleansers (CETAPHIL EX) Apply topically   Yes Historical Provider, MD        Allergies:       Patient has no known allergies. Social History:     Tobacco:    reports that he quit smoking about 37 years ago. His smoking use included cigarettes. He started smoking about 57 years ago. He smoked 4.00 packs per day. He has never used smokeless tobacco.  Alcohol:      reports previous alcohol use. Drug Use:  reports no history of drug use. Family History:     No family history on file.       Physical Exam:     Vitals:  /79   Pulse 66   Temp 98.2 °F (36.8 °C) (Temporal)   Resp 18   Ht 5' 6\" (1.676 m)   Wt 215 lb (97.5 kg)   SpO2 96%   BMI 34.70 kg/m²   Temp (24hrs), Av.1 °F (36.7 °C), Min:97.7 °F (36.5 °C), Max:98.6 °F (37 °C)          General appearance - alert, well appearing, and in no acute distress  Mental status - oriented to person, place, and time with normal affect  Head - normocephalic and atraumatic  Eyes - pupils equal and reactive, extraocular eye movements intact, conjunctiva clear  Ears - hearing appears to be intact  Nose - no drainage noted  Mouth - mucous membranes moist  Neck - supple, no carotid bruits, thyroid not palpable  Chest - clear to auscultation, normal effort  Heart - normal rate, regular rhythm, no murmur  Abdomen - soft, nontender, nondistended, bowel sounds present all four quadrants, no masses, hepatomegaly or splenomegaly  Neurological - normal speech, no focal findings or movement disorder noted, cranial nerves II through XII grossly intact  Extremities - peripheral pulses palpable, trace bilateral pedal edema, no calf pain with palpation  Skin - no gross lesions, rashes, or induration noted        Data:     Labs:    Hematology:  Recent Labs     01/18/22 0217   WBC 6.0   RBC 4.43   HGB 14.1   HCT 41.7   MCV 94.1   MCH 31.8   MCHC 33.8   RDW 12.0      MPV 9.7     Chemistry:  Recent Labs     01/18/22  0217 01/18/22  0358 01/18/22  0915     --   --    K 3.7  --   --      --   --    CO2 21  --   --    GLUCOSE 188*  --   --    BUN 27*  --   --    CREATININE 1.67*  --   --    ANIONGAP 15  --   --    LABGLOM 40*  --   --    GFRAA 48*  --   --    CALCIUM 9.3  --   --    PROBNP 80  --   --    TROPHS 19 28* 23*     No results for input(s): PROT, LABALBU, LABA1C, Y4XEDYN, X1FOSVU, FT4, TSH, AST, ALT, LDH, GGT, ALKPHOS, LABGGT, BILITOT, BILIDIR, AMMONIA, AMYLASE, LIPASE, LACTATE, CHOL, HDL, LDLCHOLESTEROL, CHOLHDLRATIO, TRIG, VLDL, TQY70BF, PHENYTOIN, PHENYF, URICACID, POCGLU in the last 72 hours.     No results found for: INR, PROTIME    No results found for: SPECIAL  No results found for: CULTURE    No results found for: POCPH, PHART, PH, POCPCO2, ERZ7GLZ, PCO2, POCPO2, PO2ART, PO2, POCHCO3, NQI6KJV, HCO3, NBEA, PBEA, BEART, BE, THGBART, THB, RZX7OOY, AOAC9CGH, D1MJWUOK, O2SAT, FIO2    Radiology:    XR CHEST 1 VIEW    Result Date: 1/18/2022  No evidence of acute cardiopulmonary disease. All radiological studies reviewed                Code Status:  Full Code    Electronically signed by Kylee Murillo MD on 1/19/2022 at 9:58 AM     Copy sent to Dr. Deb Gallegos MD    This note was created with the assistance of a speech-recognition program.  Although the intention is to generate a document that actually reflects the content of the visit, no guarantees can be provided that every mistake has been identified and corrected by editing. Note was updated later by me after  physical examination and  completion of the assessment.

## 2022-01-19 NOTE — PROGRESS NOTES
Section of Cardiology  Progress Note      Date:  1/19/2022  Patient: Nicola Arzate  Admission:  1/18/2022  2:11 AM  Admit DX: Chest pain [R07.9]  Chest pain, unspecified type [R07.9]  Chronic kidney disease, unspecified CKD stage [N18.9]  Age:  79 y. o., 1940     LOS: 1 day     Reason for evaluation:   chest pain      SUBJECTIVE:     The patient was seen and examined. Notes and labs reviewed. There were not complications over night. Patient seen and examined in his room. The patient did not feel comfortable going home before trying Nitropatch. He walked in the [facility] and nurses station. Did not report any further chest pain. He did not require sublingual nitroglycerin. No shortness of breath. Denies any diaphoresis. No nausea or vomiting. Did not have headache with the Nitropatch. OBJECTIVE:    Telemetry: Sinus  /79   Pulse 66   Temp 98.2 °F (36.8 °C) (Temporal)   Resp 18   Ht 5' 6\" (1.676 m)   Wt 215 lb (97.5 kg)   SpO2 96%   BMI 34.70 kg/m²     Intake/Output Summary (Last 24 hours) at 1/19/2022 0859  Last data filed at 1/18/2022 1239  Gross per 24 hour   Intake 570 ml   Output --   Net 570 ml       EXAM:   CONSTITUTIONAL:  awake, alert, cooperative, no apparent distress, and appears stated age. HEENT: Normal jugular venous pulsations, no carotid bruits. Head is atraumatic, normocephalic. Eyes and oral mucosa are normal.  LUNGS: Good respiratory effort. No for increased work of breathing. On auscultation: clear to auscultation bilaterally  CARDIOVASCULAR:  Normal apical impulse, regular rate and rhythm, normal S1 and S2, no S3 or S4,   ABDOMEN: Soft, nontender, nondistended. Bowel sounds present. SKIN: Warm and dry. EXTREMITIES: No lower extremity edema. Motor movement grossly intact. No cyanosis or clubbing.     Current Inpatient Medications:   morphine  4 mg IntraVENous Once    aspirin  325 mg Oral Daily    clopidogrel  75 mg Oral Daily    dilTIAZem  180 mg Oral Daily    ezetimibe  10 mg Oral Nightly    multivitamin  1 tablet Oral Daily    atorvastatin  20 mg Oral Daily    Vitamin D  1,000 Units Oral Daily    sodium chloride flush  5-40 mL IntraVENous 2 times per day    enoxaparin  40 mg SubCUTAneous Daily    tamsulosin  0.4 mg Oral Daily    hydroCHLOROthiazide  12.5 mg Oral Every Other Day    nitroGLYCERIN  1 patch TransDERmal Daily    metoprolol succinate  25 mg Oral Nightly       IV Infusions (if any):   sodium chloride         Diagnostics:       Labs:   CBC:   Recent Labs     01/18/22 0217   WBC 6.0   HGB 14.1   HCT 41.7        BMP:   Recent Labs     01/18/22 0217      K 3.7   CO2 21   BUN 27*   CREATININE 1.67*   LABGLOM 40*   GLUCOSE 188*     No results found for: BNP  PT/INR: No results for input(s): PROTIME, INR in the last 72 hours. APTT:No results for input(s): APTT in the last 72 hours. CARDIAC ENZYMES:  Recent Labs     01/18/22 0217 01/18/22  0358 01/18/22  0915   TROPONINT NOT REPORTED NOT REPORTED NOT REPORTED     FASTING LIPID PANEL:No results found for: HDL, LDLDIRECT, LDLCALC, TRIG  LIVER PROFILE:No results for input(s): AST, ALT, LABALBU in the last 72 hours. ASSESSMENT:  1. Chest pain suggestive of angina without good response to sublingual nitroglycerin  2. Minimal elevation of troponin  3. History of coronary disease and PCI in the remote past  4. History of hypertension, systemic  5. Hyperlipidemia  6. Obesity. Patient Active Problem List   Diagnosis    Chest pain       PLAN:    Stable cardiac status to be discharged to follow-up with his cardiologist in the office. He will have prescription for sublingual Nitroglycerin and Nitropatch. Baseline he was instructed to call the office if he started to have chest pain again not responding to nitroglycerin. Please see orders. Discussed with patient and other,  and nursing.     Ellis Holden MD, MD

## 2022-01-19 NOTE — FLOWSHEET NOTE
Dr Bobbi Munoz and Dr Nash Bradley in to see patient, updated on patient, states that patient can be discharge wiwth follow-up in 1 week

## 2022-01-19 NOTE — H&P
History & Physical  [facility]    Adult [facility]ist      Name: Ceci Hardin  MRN: [id number]     Acct: [de-identified]  Room: 2041/2041-01    Admit Date: 1/18/2022  2:11 AM  PCP: Selin Downs MD    Primary Problem  Active Problems:    Chest pain  Resolved Problems:    * No resolved hospital problems. *        Assesment:     · Acute chest pain  · Coronary artery disease, native vessel  · Essential hypertension  · Mixed hyperlipidemia  · Obesity with BMI 34  · Benign prostatic hypertrophy  · Obstructive sleep apnea  · Chronic kidney disease, stage unspecified  · Pedal edema        Plan:     · Admit to progressive  · Monitor vitals closely  · Keep SPO2 above 90%  · I's and O's  · IV fluids  · Pain control  · Antiemetics as needed  · Cardiology consulted in ER  · Serial cardiac enzymes  · Serial EKG  · Resume essential home meds  · CBC, BMP  · DVT and GI prophylaxis. Chief Complaint:     Chief Complaint   Patient presents with    Chest Pain         History of Present Illness:      Ceci Hardin is a 79 y.o.  male who presents with Chest Pain    Patient was admitted through the emergency room where he presented with chest pain and chest pressure. Patient says he was woken up by the pressure around 12:30 AM.  Pain was dull and moderate. He denies any radiation of pain to the back sides, neck or his jaw. He denies any diaphoresis, nausea or vomiting at that time. Patient complains of some palpitations. He says he took an aspirin and called 911    Patient says he was given nitroglycerin sublingual and he felt significant improvement within the next few minutes. He says the pain was almost letting off by the time he was brought to the emergency room. Another dose was not repeated and the pain fully resolved in the emergency room. Patient has a history of coronary artery disease and has history of cardiac catheterization and stent placement.     Patient denies any orthopnea, dyspnea, headache, nausea or vomiting. Denies any photophobia or diplopia. Denies any back pain or joint swelling. Denies any diarrhea or constipation    I have personally reviewed the past medical history, past surgical history, medications, social history, and family history, and summarized in the note. Review of Systems:     All 10 point system is reviewed and negative otherwise mentioned in HPI. Past Medical History:     Past Medical History:   Diagnosis Date    CAD (coronary artery disease)     Chronic kidney disease     Hypertension     Sinus infection     Sleep apnea         Past Surgical History:     Past Surgical History:   Procedure Laterality Date    ANGIOPLASTY  04/12/2001    APPENDECTOMY      CARDIAC CATHETERIZATION  11/18/2005    stent RCA between 2 previous stents    CATARACT REMOVAL Left 12/15/2011    CATARACT REMOVAL Right 01/05/2012    CORONARY ANGIOPLASTY  12/1995    CORONARY ANGIOPLASTY  04/12/2001    3 stents in the RCA    HERNIA REPAIR      TONSILLECTOMY      TURP  02/02/2015    TURP  01/30/2020    TURBT bladder tumor/polyp        Medications Prior to Admission:       Prior to Admission medications    Medication Sig Start Date End Date Taking? Authorizing Provider   clopidogrel (PLAVIX) 75 MG tablet Take 75 mg by mouth daily   Yes Historical Provider, MD   dilTIAZem (CARDIZEM CD) 180 MG extended release capsule Take 180 mg by mouth daily   Yes Historical Provider, MD   hydroCHLOROthiazide (MICROZIDE) 12.5 MG capsule Take 12.5 mg by mouth daily   Yes Historical Provider, MD   metoprolol succinate (TOPROL XL) 25 MG extended release tablet Take 25 mg by mouth daily   Yes Historical Provider, MD   nitroGLYCERIN (NITROSTAT) 0.4 MG SL tablet Place 0.4 mg under the tongue every 5 minutes as needed for Chest pain up to max of 3 total doses. If no relief after 1 dose, call 911.    Yes Historical Provider, MD   simvastatin (ZOCOR) 40 MG tablet Take 40 mg by mouth every morning   Yes Historical Provider, MD   ezetimibe (ZETIA) 10 MG tablet Take 10 mg by mouth nightly   Yes Historical Provider, MD   halobetasol (ULTRAVATE) 0.05 % cream Apply topically 2 times daily as needed Apply topically 2 times daily. Yes Historical Provider, MD   alfuzosin (UROXATRAL) 10 MG extended release tablet Take 10 mg by mouth daily   Yes Historical Provider, MD   aspirin 325 MG tablet Take 325 mg by mouth daily   Yes Historical Provider, MD   Nizatidine (AXID PO) Take 1 tablet by mouth as needed   Yes Historical Provider, MD   KRILL OIL PO Take 1 capsule by mouth daily   Yes Historical Provider, MD   Multiple Vitamin (MULTIVITAMIN ADULT PO) Take 1 capsule by mouth daily   Yes Historical Provider, MD   Multiple Vitamins-Minerals (OCUVITE ADULT 50+ PO) Take 1 capsule by mouth daily   Yes Historical Provider, MD   Vitamin D (CHOLECALCIFEROL) 25 MCG (1000 UT) TABS tablet Take 1,000 Units by mouth daily   Yes Historical Provider, MD   Soap & Cleansers (CETAPHIL EX) Apply topically   Yes Historical Provider, MD        Allergies:       Patient has no known allergies. Social History:     Tobacco:    has no history on file for tobacco use. Alcohol:      has no history on file for alcohol use. Drug Use:  has no history on file for drug use. Family History:     No family history on file.       Physical Exam:     Vitals:  /78   Pulse 67   Temp 97.7 °F (36.5 °C) (Temporal)   Resp 18   Ht 5' 6\" (1.676 m)   Wt 215 lb (97.5 kg)   SpO2 99%   BMI 34.70 kg/m²   Temp (24hrs), Av.3 °F (36.8 °C), Min:97.7 °F (36.5 °C), Max:98.6 °F (37 °C)          General appearance - alert, well appearing, and in no acute distress  Mental status - oriented to person, place, and time with normal affect  Head - normocephalic and atraumatic  Eyes - pupils equal and reactive, extraocular eye movements intact, conjunctiva clear  Ears - hearing appears to be intact  Nose - no drainage noted  Mouth - mucous membranes moist  Neck - supple, no carotid bruits, thyroid not palpable  Chest - clear to auscultation, normal effort  Heart - normal rate, regular rhythm, no murmur  Abdomen - soft, nontender, nondistended, bowel sounds present all four quadrants, no masses, hepatomegaly or splenomegaly  Neurological - normal speech, no focal findings or movement disorder noted, cranial nerves II through XII grossly intact  Extremities - peripheral pulses palpable, trace bilateral pedal edema, no calf pain with palpation  Skin - no gross lesions, rashes, or induration noted        Data:     Labs:    Hematology:  Recent Labs     01/18/22 0217   WBC 6.0   RBC 4.43   HGB 14.1   HCT 41.7   MCV 94.1   MCH 31.8   MCHC 33.8   RDW 12.0      MPV 9.7     Chemistry:  Recent Labs     01/18/22 0217 01/18/22  0358 01/18/22  0915     --   --    K 3.7  --   --      --   --    CO2 21  --   --    GLUCOSE 188*  --   --    BUN 27*  --   --    CREATININE 1.67*  --   --    ANIONGAP 15  --   --    LABGLOM 40*  --   --    GFRAA 48*  --   --    CALCIUM 9.3  --   --    PROBNP 80  --   --    TROPHS 19 28* 23*     No results for input(s): PROT, LABALBU, LABA1C, J7HUPXO, F7WQNDR, FT4, TSH, AST, ALT, LDH, GGT, ALKPHOS, LABGGT, BILITOT, BILIDIR, AMMONIA, AMYLASE, LIPASE, LACTATE, CHOL, HDL, LDLCHOLESTEROL, CHOLHDLRATIO, TRIG, VLDL, WFM51QW, PHENYTOIN, PHENYF, URICACID, POCGLU in the last 72 hours. No results found for: INR, PROTIME    No results found for: SPECIAL  No results found for: CULTURE    No results found for: POCPH, PHART, PH, POCPCO2, AMB0JGK, PCO2, POCPO2, PO2ART, PO2, POCHCO3, SRJ5NMP, HCO3, NBEA, PBEA, BEART, BE, THGBART, THB, NPH8RJS, ILVB7TMY, Q3GVSOUR, O2SAT, FIO2    Radiology:    XR CHEST 1 VIEW    Result Date: 1/18/2022  No evidence of acute cardiopulmonary disease.          All radiological studies reviewed                Code Status:  Full Code    Electronically signed by Samuel Adair MD on 1/18/2022 at 7:44 PM     Copy sent to Dr. Price Signs,

## 2022-01-19 NOTE — FLOWSHEET NOTE
Wife at bedside, discharge instructions/AVS reviewed with patient using teachback method. Printed prescriptions given to patient. Patient voiced understanding regarding prescriptions/medications, follow up appointments, and care of self at home.  Pt taken to front entrance via wheelchair and discharged home with wife

## 2022-01-20 LAB
EKG ATRIAL RATE: 101 BPM
EKG P AXIS: 50 DEGREES
EKG P-R INTERVAL: 226 MS
EKG Q-T INTERVAL: 348 MS
EKG QRS DURATION: 108 MS
EKG QTC CALCULATION (BAZETT): 451 MS
EKG R AXIS: -34 DEGREES
EKG T AXIS: 57 DEGREES
EKG VENTRICULAR RATE: 101 BPM

## 2022-01-20 PROCEDURE — 93010 ELECTROCARDIOGRAM REPORT: CPT | Performed by: INTERNAL MEDICINE

## 2022-05-29 ENCOUNTER — APPOINTMENT (OUTPATIENT)
Dept: GENERAL RADIOLOGY | Age: 82
End: 2022-05-29
Payer: MEDICARE

## 2022-05-29 ENCOUNTER — HOSPITAL ENCOUNTER (EMERGENCY)
Age: 82
Discharge: HOME | End: 2022-05-30
Attending: EMERGENCY MEDICINE
Payer: MEDICARE

## 2022-05-29 DIAGNOSIS — R00.0 TACHYCARDIA: ICD-10-CM

## 2022-05-29 DIAGNOSIS — R07.9 CHEST PAIN, UNSPECIFIED TYPE: Primary | ICD-10-CM

## 2022-05-29 LAB
ABSOLUTE EOS #: 0.12 K/UL (ref 0–0.44)
ABSOLUTE IMMATURE GRANULOCYTE: 0.02 K/UL (ref 0–0.3)
ABSOLUTE LYMPH #: 1.45 K/UL (ref 1.1–3.7)
ABSOLUTE MONO #: 0.8 K/UL (ref 0.1–1.2)
ANION GAP SERPL CALCULATED.3IONS-SCNC: 10 MMOL/L (ref 9–17)
BASOPHILS # BLD: 0 % (ref 0–2)
BASOPHILS ABSOLUTE: 0.03 K/UL (ref 0–0.2)
BUN BLDV-MCNC: 27 MG/DL (ref 8–23)
BUN/CREAT BLD: 15 (ref 9–20)
CALCIUM SERPL-MCNC: 9 MG/DL (ref 8.6–10.4)
CHLORIDE BLD-SCNC: 101 MMOL/L (ref 98–107)
CO2: 25 MMOL/L (ref 20–31)
CREAT SERPL-MCNC: 1.8 MG/DL (ref 0.7–1.2)
EOSINOPHILS RELATIVE PERCENT: 2 % (ref 1–4)
GFR AFRICAN AMERICAN: 44 ML/MIN
GFR NON-AFRICAN AMERICAN: 36 ML/MIN
GFR SERPL CREATININE-BSD FRML MDRD: ABNORMAL ML/MIN/{1.73_M2}
GLUCOSE BLD-MCNC: 128 MG/DL (ref 70–99)
HCT VFR BLD CALC: 40.8 % (ref 40.7–50.3)
HEMOGLOBIN: 13.6 G/DL (ref 13–17)
IMMATURE GRANULOCYTES: 0 %
LYMPHOCYTES # BLD: 19 % (ref 24–43)
MCH RBC QN AUTO: 31.6 PG (ref 25.2–33.5)
MCHC RBC AUTO-ENTMCNC: 33.3 G/DL (ref 28.4–34.8)
MCV RBC AUTO: 94.7 FL (ref 82.6–102.9)
MONOCYTES # BLD: 10 % (ref 3–12)
MYOGLOBIN: 126 NG/ML (ref 28–72)
NRBC AUTOMATED: 0 PER 100 WBC
PDW BLD-RTO: 12.8 % (ref 11.8–14.4)
PLATELET # BLD: 178 K/UL (ref 138–453)
PMV BLD AUTO: 9.5 FL (ref 8.1–13.5)
POTASSIUM SERPL-SCNC: 4.2 MMOL/L (ref 3.7–5.3)
RBC # BLD: 4.31 M/UL (ref 4.21–5.77)
SEG NEUTROPHILS: 69 % (ref 36–65)
SEGMENTED NEUTROPHILS ABSOLUTE COUNT: 5.43 K/UL (ref 1.5–8.1)
SODIUM BLD-SCNC: 136 MMOL/L (ref 135–144)
TROPONIN, HIGH SENSITIVITY: 21 NG/L (ref 0–22)
TSH SERPL DL<=0.05 MIU/L-ACNC: 4.41 UIU/ML (ref 0.3–5)
WBC # BLD: 7.9 K/UL (ref 3.5–11.3)

## 2022-05-29 PROCEDURE — 83874 ASSAY OF MYOGLOBIN: CPT

## 2022-05-29 PROCEDURE — 96374 THER/PROPH/DIAG INJ IV PUSH: CPT

## 2022-05-29 PROCEDURE — 71045 X-RAY EXAM CHEST 1 VIEW: CPT

## 2022-05-29 PROCEDURE — 93005 ELECTROCARDIOGRAM TRACING: CPT | Performed by: EMERGENCY MEDICINE

## 2022-05-29 PROCEDURE — 80048 BASIC METABOLIC PNL TOTAL CA: CPT

## 2022-05-29 PROCEDURE — 85025 COMPLETE CBC W/AUTO DIFF WBC: CPT

## 2022-05-29 PROCEDURE — 2580000003 HC RX 258: Performed by: EMERGENCY MEDICINE

## 2022-05-29 PROCEDURE — 84484 ASSAY OF TROPONIN QUANT: CPT

## 2022-05-29 PROCEDURE — 2500000003 HC RX 250 WO HCPCS: Performed by: EMERGENCY MEDICINE

## 2022-05-29 PROCEDURE — 99285 EMERGENCY DEPT VISIT HI MDM: CPT

## 2022-05-29 PROCEDURE — 84443 ASSAY THYROID STIM HORMONE: CPT

## 2022-05-29 RX ORDER — SODIUM CHLORIDE 9 MG/ML
INJECTION, SOLUTION INTRAVENOUS CONTINUOUS
Status: DISCONTINUED | OUTPATIENT
Start: 2022-05-29 | End: 2022-05-30 | Stop reason: HOSPADM

## 2022-05-29 RX ORDER — DILTIAZEM HYDROCHLORIDE 5 MG/ML
20 INJECTION INTRAVENOUS ONCE
Status: COMPLETED | OUTPATIENT
Start: 2022-05-29 | End: 2022-05-29

## 2022-05-29 RX ADMIN — SODIUM CHLORIDE: 9 INJECTION, SOLUTION INTRAVENOUS at 23:24

## 2022-05-29 RX ADMIN — DILTIAZEM HYDROCHLORIDE 20 MG: 5 INJECTION INTRAVENOUS at 23:23

## 2022-05-29 ASSESSMENT — ENCOUNTER SYMPTOMS
SHORTNESS OF BREATH: 0
FACIAL SWELLING: 0
EYE DISCHARGE: 0
CONSTIPATION: 0
ABDOMINAL PAIN: 0
DIARRHEA: 0
VOMITING: 0
EYE REDNESS: 0
COLOR CHANGE: 0
COUGH: 0

## 2022-05-29 ASSESSMENT — PAIN - FUNCTIONAL ASSESSMENT: PAIN_FUNCTIONAL_ASSESSMENT: NONE - DENIES PAIN

## 2022-05-30 VITALS
TEMPERATURE: 98.6 F | HEIGHT: 66 IN | SYSTOLIC BLOOD PRESSURE: 115 MMHG | WEIGHT: 210 LBS | HEART RATE: 76 BPM | DIASTOLIC BLOOD PRESSURE: 69 MMHG | RESPIRATION RATE: 14 BRPM | OXYGEN SATURATION: 95 % | BODY MASS INDEX: 33.75 KG/M2

## 2022-05-30 LAB
MYOGLOBIN: 94 NG/ML (ref 28–72)
TROPONIN, HIGH SENSITIVITY: 24 NG/L (ref 0–22)

## 2022-05-30 PROCEDURE — 84484 ASSAY OF TROPONIN QUANT: CPT

## 2022-05-30 PROCEDURE — 83874 ASSAY OF MYOGLOBIN: CPT

## 2022-05-30 NOTE — ED PROVIDER NOTES
[address] [place] [address] ED  EMERGENCY DEPARTMENT ENCOUNTER      Pt Name: Trini Grier  MRN: [id number]  [place] 1940  Date of evaluation: 5/29/2022  Provider: Chester Espinosa MD    CHIEF COMPLAINT       Chief Complaint   Patient presents with    Palpitations         HISTORY OF PRESENT ILLNESS  (Location/Symptom, Timing/Onset, Context/Setting, Quality, Duration, Modifying Factors, Severity.)   Trini Grier is a 79 y.o. male who presents to the emergency department heartbeat. It started about 7:00 tonight when he was sitting. Its been continuous since then. No syncope or dizziness or chest pain but he feels that his heart is racing. He has never had anything like this before. He has 4 cardiac stents. No fever vomiting or shortness of breath. Nursing Notes were reviewed. ALLERGIES     Patient has no known allergies. CURRENT MEDICATIONS       Previous Medications    ALFUZOSIN (UROXATRAL) 10 MG EXTENDED RELEASE TABLET    Take 10 mg by mouth daily    ASPIRIN 325 MG TABLET    Take 325 mg by mouth daily    CLOPIDOGREL (PLAVIX) 75 MG TABLET    Take 75 mg by mouth daily    DILTIAZEM (CARDIZEM CD) 180 MG EXTENDED RELEASE CAPSULE    Take 180 mg by mouth daily    EZETIMIBE (ZETIA) 10 MG TABLET    Take 10 mg by mouth nightly    HALOBETASOL (ULTRAVATE) 0.05 % CREAM    Apply topically 2 times daily as needed Apply topically 2 times daily. HYDROCHLOROTHIAZIDE (MICROZIDE) 12.5 MG CAPSULE    Take 12.5 mg by mouth daily    METOPROLOL SUCCINATE (TOPROL XL) 25 MG EXTENDED RELEASE TABLET    Take 25 mg by mouth daily    MULTIPLE VITAMIN (MULTIVITAMIN ADULT PO)    Take 1 capsule by mouth daily    MULTIPLE VITAMINS-MINERALS (OCUVITE ADULT 50+ PO)    Take 1 capsule by mouth daily    NITROGLYCERIN (NITRODUR) 0.2 MG/HR    Place 1 patch onto the skin daily    NITROGLYCERIN (NITROSTAT) 0.4 MG SL TABLET    Place 0.4 mg under the tongue every 5 minutes as needed for Chest pain up to max of 3 total doses.  If no relief after 1 dose, call 911. NIZATIDINE (AXID PO)    Take 1 tablet by mouth as needed    OMEPRAZOLE (PRILOSEC) 20 MG DELAYED RELEASE CAPSULE    Take 1 capsule by mouth every morning (before breakfast)    SIMVASTATIN (ZOCOR) 40 MG TABLET    Take 40 mg by mouth every morning    SOAP & CLEANSERS (CETAPHIL EX)    Apply topically    VITAMIN D (CHOLECALCIFEROL) 25 MCG (1000 UT) TABS TABLET    Take 1,000 Units by mouth daily       PAST MEDICAL HISTORY         Diagnosis Date    CAD (coronary artery disease)     Chronic kidney disease     Hypertension     Sinus infection     Sleep apnea        SURGICAL HISTORY           Procedure Laterality Date    ANGIOPLASTY  04/12/2001    APPENDECTOMY      CARDIAC CATHETERIZATION  11/18/2005    stent RCA between 2 previous stents    CATARACT REMOVAL Left 12/15/2011    CATARACT REMOVAL Right 01/05/2012    CORONARY ANGIOPLASTY  12/1995    CORONARY ANGIOPLASTY  04/12/2001    3 stents in the RCA   Elias Vergara 75 TURP  02/02/2015    TURP  01/30/2020    TURBT bladder tumor/polyp         FAMILY HISTORY     History reviewed. No pertinent family history. No family status information on file. SOCIAL HISTORY      reports that he quit smoking about 37 years ago. His smoking use included cigarettes. He started smoking about 57 years ago. He smoked 4.00 packs per day. He has never used smokeless tobacco. He reports previous alcohol use. He reports that he does not use drugs. REVIEW OF SYSTEMS    (2-9 systems for level 4, 10 or more for level 5)     Review of Systems   Constitutional: Negative for chills, fatigue and fever. HENT: Negative for congestion, ear discharge and facial swelling. Eyes: Negative for discharge and redness. Respiratory: Negative for cough and shortness of breath. Cardiovascular: Positive for palpitations. Negative for chest pain. Gastrointestinal: Negative for abdominal pain, constipation, diarrhea and vomiting. Genitourinary: Negative for dysuria and hematuria. Musculoskeletal: Negative for arthralgias. Skin: Negative for color change and rash. Neurological: Negative for syncope, numbness and headaches. Hematological: Negative for adenopathy. Psychiatric/Behavioral: Negative for confusion. The patient is not nervous/anxious. Except as noted above the remainder of the review of systems was reviewed and negative. PHYSICAL EXAM    (up to 7 for level 4, 8 or more for level 5)     Vitals:    05/30/22 0036 05/30/22 0037 05/30/22 0038 05/30/22 0039   BP:       Pulse: 76 76 76 76   Resp: 20 19 18 14   Temp:       TempSrc:       SpO2: 95% 95% 95% 95%   Weight:       Height:           Physical Exam  Vitals reviewed. Constitutional:       General: He is not in acute distress. Appearance: He is well-developed. He is not diaphoretic. HENT:      Head: Normocephalic and atraumatic. Eyes:      General: No scleral icterus. Right eye: No discharge. Left eye: No discharge. Cardiovascular:      Rate and Rhythm: Regular rhythm. Tachycardia present. Pulmonary:      Effort: Pulmonary effort is normal. No respiratory distress. Breath sounds: Normal breath sounds. No stridor. No wheezing or rales. Abdominal:      General: There is no distension. Palpations: Abdomen is soft. Tenderness: There is no abdominal tenderness. Musculoskeletal:         General: Normal range of motion. Cervical back: Neck supple. Lymphadenopathy:      Cervical: No cervical adenopathy. Skin:     General: Skin is warm and dry. Findings: No erythema or rash. Neurological:      Mental Status: He is alert and oriented to person, place, and time.    Psychiatric:         Behavior: Behavior normal.             DIAGNOSTIC RESULTS     EKG: All EKG's are interpreted by the Emergency Department Physician who either signs or Co-signs this chart in the absence of a cardiologist.    EKG on my interpretation shows tachycardia. It appears to be a junctional rhythm. No ST elevation. RADIOLOGY:   Non-plain film images such as CT, Ultrasound and MRI are read by the radiologist. Plain radiographic images are visualized and preliminarily interpreted by the emergency physician with the below findings:    Interpretation per the Radiologist below, if available at the time of this note:    XR CHEST PORTABLE    Result Date: 5/29/2022  EXAMINATION: [address] 5/29/2022 11:18 pm COMPARISON: 01/18/2022 HISTORY: ORDERING SYSTEM PROVIDED HISTORY: Chest Pain TECHNOLOGIST PROVIDED HISTORY: Chest Pain FINDINGS: Cardiac and mediastinal silhouettes appear within normal limits for size. Pulmonary vascularity is normal.  No parenchymal consolidation or pleural effusion. No pneumothorax. No acute osseous abnormality. Atelectasis at the left lung base. Clear chest without acute cardiopulmonary process. LABS:  Labs Reviewed   CBC WITH AUTO DIFFERENTIAL - Abnormal; Notable for the following components:       Result Value    Seg Neutrophils 69 (*)     Lymphocytes 19 (*)     All other components within normal limits   BASIC METABOLIC PANEL - Abnormal; Notable for the following components:    Glucose 128 (*)     BUN 27 (*)     CREATININE 1.80 (*)     GFR Non- 36 (*)     GFR  44 (*)     All other components within normal limits   TROP/MYOGLOBIN - Abnormal; Notable for the following components:    Myoglobin 126 (*)     All other components within normal limits   TROP/MYOGLOBIN - Abnormal; Notable for the following components:    Troponin, High Sensitivity 24 (*)     Myoglobin 94 (*)     All other components within normal limits   TSH   TROP/MYOGLOBIN       All other labs were within normal range or not returned as of this dictation.     EMERGENCY DEPARTMENT COURSE and DIFFERENTIAL DIAGNOSIS/MDM:   Vitals:    Vitals:    05/30/22 0036 05/30/22 0037 05/30/22 0038 05/30/22 0039   BP:       Pulse: 76 76 76 76   Resp: 20 19 18 14   Temp:       TempSrc:       SpO2: 95% 95% 95% 95%   Weight:       Height:           Orders Placed This Encounter   Medications    dilTIAZem injection 20 mg    0.9 % sodium chloride infusion       Medical Decision Making: The patient presented with what appears to be junctional tachycardia. His work-up is negative including 2 sets of cardiac markers. He was given IV Cardizem and this converted him back into a sinus rhythm and he remains in a sinus rhythm at a well-controlled rate. He does not require admission the hospital.  He will call his cardiologist tomorrow in the office is open for appropriate follow-up. He was advised that the doses of his medications might need to be adjusted. Treatment diagnosis and follow-up were discussed with the patient and his family. CRITICAL CARE TIME     Due to the high probability of sudden and clinically significant deterioration in the patient's condition he required highest level of my preparedness to intervene urgently. I provided critical care time including documentation time, medication orders and management, reevaluation, vital sign assessment, ordering and reviewing of of lab tests ordering and reviewing of x-ray studies, and admission orders. Aggregate critical care time is 35 minutes including only time during which I was engaged in work directly related to his care and did not include time spent treating other patients simultaneously. CONSULTS:  None    PROCEDURES:  None    FINAL IMPRESSION      1.  Tachycardia          DISPOSITION/PLAN   DISPOSITION Decision To Discharge 05/30/2022 01:44:38 AM      PATIENT REFERRED TO:   Kaykay Roque MD  [address]  [phone]      As needed    [facility] ED  [address]  [phone]    If symptoms worsen    Your cardiologist    Call in 1 day        DISCHARGE MEDICATIONS: New Prescriptions    No medications on file       The care is provided during an unprecedented national emergency due to the novel coronavirus, COVID-19.     (Please note that portions of this note were completed with a voice recognition program.  Efforts were made to edit the dictations but occasionally words are mis-transcribed.)    Rosa Maria Caicedo MD  Attending Emergency Physician           Rosa Maria Caicedo MD  05/30/22 6212

## 2022-05-31 LAB
EKG ATRIAL RATE: 129 BPM
EKG ATRIAL RATE: 79 BPM
EKG P AXIS: 51 DEGREES
EKG P-R INTERVAL: 244 MS
EKG Q-T INTERVAL: 338 MS
EKG Q-T INTERVAL: 384 MS
EKG QRS DURATION: 102 MS
EKG QRS DURATION: 98 MS
EKG QTC CALCULATION (BAZETT): 440 MS
EKG QTC CALCULATION (BAZETT): 495 MS
EKG R AXIS: -12 DEGREES
EKG R AXIS: -27 DEGREES
EKG T AXIS: 51 DEGREES
EKG T AXIS: 55 DEGREES
EKG VENTRICULAR RATE: 129 BPM
EKG VENTRICULAR RATE: 79 BPM

## 2024-02-28 LAB
CHOLESTEROL/HDL RATIO: 2.8
CHOLESTEROL: 123 MG/DL
HDLC SERPL-MCNC: 44 MG/DL
LDL CHOLESTEROL CALCULATED: 63 MG/DL
TRIGL SERPL-MCNC: 82 MG/DL
VLDLC SERPL CALC-MCNC: 16 MG/DL

## 2024-05-02 ENCOUNTER — APPOINTMENT (OUTPATIENT)
Dept: GENERAL RADIOLOGY | Age: 84
End: 2024-05-02
Payer: MEDICARE

## 2024-05-02 ENCOUNTER — HOSPITAL ENCOUNTER (EMERGENCY)
Age: 84
Discharge: HOME | End: 2024-05-02
Attending: EMERGENCY MEDICINE
Payer: MEDICARE

## 2024-05-02 VITALS
SYSTOLIC BLOOD PRESSURE: 156 MMHG | OXYGEN SATURATION: 95 % | TEMPERATURE: 98.2 F | BODY MASS INDEX: 34.07 KG/M2 | HEART RATE: 78 BPM | RESPIRATION RATE: 16 BRPM | WEIGHT: 212 LBS | DIASTOLIC BLOOD PRESSURE: 86 MMHG | HEIGHT: 66 IN

## 2024-05-02 DIAGNOSIS — M25.551 RIGHT HIP PAIN: Primary | ICD-10-CM

## 2024-05-02 PROCEDURE — 72100 X-RAY EXAM L-S SPINE 2/3 VWS: CPT

## 2024-05-02 PROCEDURE — 73502 X-RAY EXAM HIP UNI 2-3 VIEWS: CPT

## 2024-05-02 PROCEDURE — 99283 EMERGENCY DEPT VISIT LOW MDM: CPT

## 2024-05-02 PROCEDURE — 6370000000 HC RX 637 (ALT 250 FOR IP): Performed by: EMERGENCY MEDICINE

## 2024-05-02 RX ORDER — ACETAMINOPHEN 325 MG/1
650 TABLET ORAL ONCE
Status: COMPLETED | OUTPATIENT
Start: 2024-05-02 | End: 2024-05-02

## 2024-05-02 RX ADMIN — ACETAMINOPHEN 650 MG: 325 TABLET ORAL at 11:03

## 2024-05-02 ASSESSMENT — ENCOUNTER SYMPTOMS
EYE PAIN: 0
CHEST TIGHTNESS: 0
SHORTNESS OF BREATH: 0
FACIAL SWELLING: 0
ABDOMINAL PAIN: 0
ABDOMINAL DISTENTION: 0
EYE DISCHARGE: 0
BACK PAIN: 1

## 2024-05-02 ASSESSMENT — PAIN SCALES - GENERAL
PAINLEVEL_OUTOF10: 9
PAINLEVEL_OUTOF10: 9

## 2024-05-02 ASSESSMENT — PAIN - FUNCTIONAL ASSESSMENT: PAIN_FUNCTIONAL_ASSESSMENT: 0-10

## 2024-05-02 NOTE — DISCHARGE INSTR - COC
Continuity of Care Form    Patient Name: Rickey Nevarez   :  1940  MRN:  [id number]    Admit date:  2024  Discharge date:  ***    Code Status Order: Prior   Advance Directives:     Admitting Physician:  No admitting provider for patient encounter.  PCP: Judson Alonzo MD    Discharging Nurse: ***  Discharging Hospital Unit/Room#: STA  Discharging Unit Phone Number: ***    Emergency Contact:   Extended Emergency Contact Information  Primary Emergency Contact: Roxanne Nevarez  Home Phone: [phone]  Relation: Spouse   needed? No    Past Surgical History:  Past Surgical History:   Procedure Laterality Date    ANGIOPLASTY  2001    APPENDECTOMY      CARDIAC CATHETERIZATION  2005    stent RCA between 2 previous stents    CATARACT REMOVAL Left 12/15/2011    CATARACT REMOVAL Right 2012    CORONARY ANGIOPLASTY  1995    CORONARY ANGIOPLASTY  2001    3 stents in the RCA    HERNIA REPAIR      TONSILLECTOMY      TRANSURETHRAL RESECTION OF PROSTATE  2015    TRANSURETHRAL RESECTION OF PROSTATE  2020    TURBT bladder tumor/polyp       Immunization History:   Immunization History   Administered Date(s) Administered    COVID-19, PFIZER PURPLE top, DILUTE for use, (age 12 y+), 30mcg/0.3mL 2021, 2021, 12/15/2021       Active Problems:  Patient Active Problem List   Diagnosis Code    Chest pain R07.9       Isolation/Infection:   Isolation            No Isolation          Patient Infection Status       None to display            Nurse Assessment:  Last Vital Signs: BP (!) 156/86   Pulse 78   Temp 98.2 °F (36.8 °C) (Oral)   Resp 16   Ht 1.676 m (5' 6\")   Wt 96.2 kg (212 lb)   SpO2 95%   BMI 34.22 kg/m²     Last documented pain score (0-10 scale): Pain Level: 9  Last Weight:   Wt Readings from Last 1 Encounters:   24 96.2 kg (212 lb)     Mental Status:  {IP PT MENTAL STATUS:}    IV Access:  { BEST IV ACCESS:[id number]}    Nursing

## 2024-05-02 NOTE — ED PROVIDER NOTES
EMERGENCY DEPARTMENT ENCOUNTER    Pt Name: Rickey Nevarez  MRN: [id number]  Birthdate 1940  Date of evaluation: 5/2/24  CHIEF COMPLAINT       Chief Complaint   Patient presents with    Hip Pain     Right sided     HISTORY OF PRESENT ILLNESS   HPI   The patient is an 83-year-old male with a history of hypertension who presented to the emergency department secondary to right hip pain.  Patient says symptoms began yesterday with sudden onset of pain in his right hip.  Denied any trauma or injury.  He is concerned that is his iliosacral joint.  He denies loss of bladder or bowel or urinary retention.  He is able to ambulate without assistance of wheelchair or cane.  Patient complains of pain localized to his right hip 9 out of 10 on the pain scale.  Patient [place] chest pain, shortness of breath, nausea, vomiting, fevers or chills      REVIEW OF SYSTEMS     Review of Systems   Constitutional:  Negative for chills, diaphoresis and fever.   HENT:  Negative for congestion, ear pain and facial swelling.    Eyes:  Negative for pain, discharge and visual disturbance.   Respiratory:  Negative for chest tightness and shortness of breath.    Cardiovascular:  Negative for chest pain and palpitations.   Gastrointestinal:  Negative for abdominal distention and abdominal pain.   Genitourinary:  Negative for difficulty urinating and flank pain.   Musculoskeletal:  Positive for back pain.        Right hip pain   Skin:  Negative for wound.   Neurological:  Negative for dizziness, light-headedness and headaches.     PASTMEDICAL HISTORY     Past Medical History:   Diagnosis Date    CAD (coronary artery disease)     Chronic kidney disease     Hypertension     Sinus infection     Sleep apnea      Past Problem List  Patient Active Problem List   Diagnosis Code    Chest pain R07.9     SURGICAL HISTORY       Past Surgical History:   Procedure Laterality Date    ANGIOPLASTY  04/12/2001    APPENDECTOMY      CARDIAC CATHETERIZATION

## 2024-05-02 NOTE — ED NOTES
Pt to er with c/o right lower back/buttock pain. Pt states s/sx started yesterday. Pt denies known injury. Pt states he has had problems in the past with his \"SI joint\". Pt ambulatory. Pt a&ox3. Skin warm and dry. Respirations even and non-labored.

## 2024-06-07 ENCOUNTER — HOSPITAL ENCOUNTER (EMERGENCY)
Age: 84
Discharge: HOME | End: 2024-06-07
Attending: STUDENT IN AN ORGANIZED HEALTH CARE EDUCATION/TRAINING PROGRAM
Payer: MEDICARE

## 2024-06-07 ENCOUNTER — APPOINTMENT (OUTPATIENT)
Dept: GENERAL RADIOLOGY | Age: 84
End: 2024-06-07
Payer: MEDICARE

## 2024-06-07 VITALS
DIASTOLIC BLOOD PRESSURE: 77 MMHG | HEIGHT: 66 IN | SYSTOLIC BLOOD PRESSURE: 152 MMHG | BODY MASS INDEX: 33.75 KG/M2 | TEMPERATURE: 98.5 F | RESPIRATION RATE: 18 BRPM | WEIGHT: 210 LBS | HEART RATE: 71 BPM | OXYGEN SATURATION: 97 %

## 2024-06-07 DIAGNOSIS — M23.92 KNEE INTERNAL DERANGEMENT, LEFT: Primary | ICD-10-CM

## 2024-06-07 PROCEDURE — 99283 EMERGENCY DEPT VISIT LOW MDM: CPT

## 2024-06-07 PROCEDURE — 73562 X-RAY EXAM OF KNEE 3: CPT

## 2024-06-07 RX ORDER — HYDROCODONE BITARTRATE AND ACETAMINOPHEN 5; 325 MG/1; MG/1
1-2 TABLET ORAL EVERY 8 HOURS PRN
Qty: 12 TABLET | Refills: 0 | Status: SHIPPED | OUTPATIENT
Start: 2024-06-07 | End: 2024-06-10

## 2024-06-07 RX ORDER — HYDROCODONE BITARTRATE AND ACETAMINOPHEN 5; 325 MG/1; MG/1
1-2 TABLET ORAL EVERY 8 HOURS PRN
Qty: 12 TABLET | Refills: 0 | Status: SHIPPED | OUTPATIENT
Start: 2024-06-07 | End: 2024-06-07

## 2024-06-07 ASSESSMENT — PAIN DESCRIPTION - ORIENTATION: ORIENTATION: LEFT

## 2024-06-07 ASSESSMENT — PAIN DESCRIPTION - DESCRIPTORS: DESCRIPTORS: SORE;TIGHTNESS

## 2024-06-07 ASSESSMENT — PAIN DESCRIPTION - PAIN TYPE: TYPE: ACUTE PAIN

## 2024-06-07 ASSESSMENT — PAIN DESCRIPTION - LOCATION: LOCATION: KNEE

## 2024-06-07 ASSESSMENT — PAIN DESCRIPTION - FREQUENCY: FREQUENCY: CONTINUOUS

## 2024-06-07 ASSESSMENT — PAIN - FUNCTIONAL ASSESSMENT: PAIN_FUNCTIONAL_ASSESSMENT: 0-10

## 2024-06-07 ASSESSMENT — PAIN SCALES - GENERAL: PAINLEVEL_OUTOF10: 8

## 2024-06-08 NOTE — DISCHARGE INSTRUCTIONS
Take meds as prescribed.  Follow up with doctor  in 3 -4 days.  Return to ER immediately if symptoms worsen or persist.    Do not drive, operate machinery, climb or engage in any dangerous activity while taking narcotics or norco

## 2024-06-08 NOTE — ED PROVIDER NOTES
[facility] ED  eMERGENCY dEPARTMENTeNCOUnter      Pt Name: Rickey Nevarez  MRN: [id number]  Birthdate 1940  Date ofevaluation: 6/7/2024  Provider: Filipe Mccain PA-C    CHIEF COMPLAINT       Chief Complaint   Patient presents with    Knee Pain     C/o left knee pain, states he walked down the basement steps and twisted left knee, felt a pop and now has a lump on that knee         HISTORY OF PRESENT ILLNESS  (Location/Symptom, Timing/Onset, Context/Setting, Quality, Duration, Modifying Factors, Severity.)   Rickey Nevarez is a 83 y.o. male who presents to the emergency department with left knee pain.  This has been an ongoing issue.  It worsened a couple weeks ago after playing golf and walking on heels.  Patient felt a pop today when going up the stairs reports some pain and swelling ever since.      Nursing Notes were reviewed.    ALLERGIES     Patient has no known allergies.    CURRENT MEDICATIONS       Previous Medications    ALFUZOSIN (UROXATRAL) 10 MG EXTENDED RELEASE TABLET    Take 10 mg by mouth daily    ASPIRIN 325 MG TABLET    Take 325 mg by mouth daily    CLOPIDOGREL (PLAVIX) 75 MG TABLET    Take 75 mg by mouth daily    DILTIAZEM (CARDIZEM CD) 180 MG EXTENDED RELEASE CAPSULE    Take 180 mg by mouth daily    EZETIMIBE (ZETIA) 10 MG TABLET    Take 10 mg by mouth nightly    HALOBETASOL (ULTRAVATE) 0.05 % CREAM    Apply topically 2 times daily as needed Apply topically 2 times daily.    HYDROCHLOROTHIAZIDE (MICROZIDE) 12.5 MG CAPSULE    Take 12.5 mg by mouth daily    METOPROLOL SUCCINATE (TOPROL XL) 25 MG EXTENDED RELEASE TABLET    Take 25 mg by mouth daily    MULTIPLE VITAMIN (MULTIVITAMIN ADULT PO)    Take 1 capsule by mouth daily    MULTIPLE VITAMINS-MINERALS (OCUVITE ADULT 50+ PO)    Take 1 capsule by mouth daily    NITROGLYCERIN (NITRODUR) 0.2 MG/HR    Place 1 patch onto the skin daily    NITROGLYCERIN (NITROSTAT) 0.4 MG SL TABLET    Place 0.4 mg under the tongue every 5 minutes

## 2025-07-10 ENCOUNTER — OFFICE VISIT (OUTPATIENT)
Age: 85
End: 2025-07-10

## 2025-07-10 VITALS
OXYGEN SATURATION: 98 % | HEART RATE: 71 BPM | WEIGHT: 205 LBS | DIASTOLIC BLOOD PRESSURE: 80 MMHG | SYSTOLIC BLOOD PRESSURE: 148 MMHG | TEMPERATURE: 97.8 F | BODY MASS INDEX: 32.95 KG/M2 | RESPIRATION RATE: 16 BRPM | HEIGHT: 66 IN

## 2025-07-10 DIAGNOSIS — M62.830 SPASM OF MUSCLE OF LOWER BACK: Primary | ICD-10-CM

## 2025-07-10 DIAGNOSIS — R03.0 ELEVATED BLOOD PRESSURE READING: ICD-10-CM

## 2025-07-10 RX ORDER — LIDOCAINE 50 MG/G
1 PATCH TOPICAL DAILY
Qty: 7 PATCH | Refills: 0 | Status: SHIPPED | OUTPATIENT
Start: 2025-07-10

## 2025-07-10 RX ORDER — METHYLPREDNISOLONE 4 MG/1
TABLET ORAL
Qty: 1 KIT | Refills: 0 | Status: SHIPPED | OUTPATIENT
Start: 2025-07-10 | End: 2025-07-16

## 2025-07-10 RX ORDER — DEXAMETHASONE SODIUM PHOSPHATE 10 MG/ML
10 INJECTION, SOLUTION INTRAMUSCULAR; INTRAVENOUS ONCE
Status: COMPLETED | OUTPATIENT
Start: 2025-07-10 | End: 2025-07-10

## 2025-07-10 RX ADMIN — DEXAMETHASONE SODIUM PHOSPHATE 10 MG: 10 INJECTION, SOLUTION INTRAMUSCULAR; INTRAVENOUS at 11:17

## 2025-07-10 ASSESSMENT — ENCOUNTER SYMPTOMS
ABDOMINAL PAIN: 0
BACK PAIN: 1

## 2025-07-10 NOTE — PROGRESS NOTES
[facility] [place] [address]  Phone: [phone]  Fax: [phone]      Rickey Nevarez  1940  MRN: [id number]  Date of visit: 7/10/2025    Chief Complaint:     Rickey Nevarez (:  1940) is a 84 y.o. male,New patient, here for evaluation of the following chief complaint(s):  Back Pain (X3 days)      No Known Allergies     Current Outpatient Medications   Medication Sig Dispense Refill    methylPREDNISolone (MEDROL DOSEPACK) 4 MG tablet Take by mouth as directed on package 1 kit 0    lidocaine (LIDODERM) 5 % Place 1 patch onto the skin daily 12 hours on, 12 hours off. 7 patch 0    nitroGLYCERIN (NITRODUR) 0.2 MG/HR Place 1 patch onto the skin daily 30 patch 1    clopidogrel (PLAVIX) 75 MG tablet Take 1 tablet by mouth daily      dilTIAZem (CARDIZEM CD) 180 MG extended release capsule Take 1 capsule by mouth daily      hydroCHLOROthiazide (MICROZIDE) 12.5 MG capsule Take 1 capsule by mouth daily      metoprolol succinate (TOPROL XL) 25 MG extended release tablet Take 1 tablet by mouth daily      nitroGLYCERIN (NITROSTAT) 0.4 MG SL tablet Place 1 tablet under the tongue every 5 minutes as needed for Chest pain up to max of 3 total doses. If no relief after 1 dose, call 911.      simvastatin (ZOCOR) 40 MG tablet Take 1 tablet by mouth every morning      ezetimibe (ZETIA) 10 MG tablet Take 1 tablet by mouth nightly      halobetasol (ULTRAVATE) 0.05 % cream Apply topically 2 times daily as needed Apply topically 2 times daily.      alfuzosin (UROXATRAL) 10 MG extended release tablet Take 1 tablet by mouth daily      aspirin 325 MG tablet Take 1 tablet by mouth daily      Multiple Vitamin (MULTIVITAMIN ADULT PO) Take 1 capsule by mouth daily      Multiple Vitamins-Minerals (OCUVITE ADULT 50+ PO) Take 1 capsule by mouth daily      Vitamin D (CHOLECALCIFEROL) 25 MCG (1000 UT) TABS tablet Take 1 tablet by mouth daily      omeprazole (PRILOSEC) 20 MG